# Patient Record
Sex: MALE | Race: WHITE | NOT HISPANIC OR LATINO | Employment: OTHER
[De-identification: names, ages, dates, MRNs, and addresses within clinical notes are randomized per-mention and may not be internally consistent; named-entity substitution may affect disease eponyms.]

---

## 2023-12-03 ENCOUNTER — HEALTH MAINTENANCE LETTER (OUTPATIENT)
Age: 43
End: 2023-12-03

## 2023-12-08 ENCOUNTER — OFFICE VISIT (OUTPATIENT)
Dept: FAMILY MEDICINE | Facility: CLINIC | Age: 43
End: 2023-12-08
Payer: COMMERCIAL

## 2023-12-08 ENCOUNTER — ANCILLARY PROCEDURE (OUTPATIENT)
Dept: GENERAL RADIOLOGY | Facility: CLINIC | Age: 43
End: 2023-12-08
Payer: COMMERCIAL

## 2023-12-08 VITALS
HEART RATE: 83 BPM | WEIGHT: 186.9 LBS | DIASTOLIC BLOOD PRESSURE: 78 MMHG | TEMPERATURE: 98.6 F | HEIGHT: 71 IN | SYSTOLIC BLOOD PRESSURE: 122 MMHG | OXYGEN SATURATION: 97 % | BODY MASS INDEX: 26.17 KG/M2

## 2023-12-08 DIAGNOSIS — R06.09 DYSPNEA ON EXERTION: ICD-10-CM

## 2023-12-08 DIAGNOSIS — L51.9 ERYTHEMA MULTIFORME: ICD-10-CM

## 2023-12-08 DIAGNOSIS — K70.10 ALCOHOLIC HEPATITIS WITHOUT ASCITES (H): ICD-10-CM

## 2023-12-08 DIAGNOSIS — Z13.228 SCREENING FOR ENDOCRINE, NUTRITIONAL, METABOLIC AND IMMUNITY DISORDER: ICD-10-CM

## 2023-12-08 DIAGNOSIS — Z13.21 SCREENING FOR ENDOCRINE, NUTRITIONAL, METABOLIC AND IMMUNITY DISORDER: ICD-10-CM

## 2023-12-08 DIAGNOSIS — D50.0 IRON DEFICIENCY ANEMIA DUE TO CHRONIC BLOOD LOSS: ICD-10-CM

## 2023-12-08 DIAGNOSIS — K92.1 BLOOD IN STOOL: ICD-10-CM

## 2023-12-08 DIAGNOSIS — Z13.29 SCREENING FOR ENDOCRINE, NUTRITIONAL, METABOLIC AND IMMUNITY DISORDER: ICD-10-CM

## 2023-12-08 DIAGNOSIS — F10.20 ALCOHOL USE DISORDER, MODERATE, DEPENDENCE (H): ICD-10-CM

## 2023-12-08 DIAGNOSIS — J02.9 SORE THROAT: ICD-10-CM

## 2023-12-08 DIAGNOSIS — Z13.0 SCREENING FOR ENDOCRINE, NUTRITIONAL, METABOLIC AND IMMUNITY DISORDER: ICD-10-CM

## 2023-12-08 DIAGNOSIS — K21.00 GASTROESOPHAGEAL REFLUX DISEASE WITH ESOPHAGITIS WITHOUT HEMORRHAGE: ICD-10-CM

## 2023-12-08 LAB
ALBUMIN SERPL BCG-MCNC: 4.8 G/DL (ref 3.5–5.2)
ALP SERPL-CCNC: 126 U/L (ref 40–150)
ALT SERPL W P-5'-P-CCNC: 287 U/L (ref 0–70)
ANION GAP SERPL CALCULATED.3IONS-SCNC: 11 MMOL/L (ref 7–15)
AST SERPL W P-5'-P-CCNC: 402 U/L (ref 0–45)
ATRIAL RATE - MUSE: 78 BPM
BASOPHILS # BLD AUTO: 0 10E3/UL (ref 0–0.2)
BASOPHILS NFR BLD AUTO: 1 %
BILIRUB SERPL-MCNC: 0.4 MG/DL
BUN SERPL-MCNC: 6.1 MG/DL (ref 6–20)
CALCIUM SERPL-MCNC: 9.6 MG/DL (ref 8.6–10)
CHLORIDE SERPL-SCNC: 103 MMOL/L (ref 98–107)
CHOLEST SERPL-MCNC: 189 MG/DL
CREAT SERPL-MCNC: 0.58 MG/DL (ref 0.67–1.17)
DEPRECATED HCO3 PLAS-SCNC: 23 MMOL/L (ref 22–29)
DIASTOLIC BLOOD PRESSURE - MUSE: NORMAL MMHG
EGFRCR SERPLBLD CKD-EPI 2021: >90 ML/MIN/1.73M2
EOSINOPHIL # BLD AUTO: 0.2 10E3/UL (ref 0–0.7)
EOSINOPHIL NFR BLD AUTO: 3 %
ERYTHROCYTE [DISTWIDTH] IN BLOOD BY AUTOMATED COUNT: 16.5 % (ref 10–15)
ERYTHROCYTE [SEDIMENTATION RATE] IN BLOOD BY WESTERGREN METHOD: 11 MM/HR (ref 0–15)
FASTING STATUS PATIENT QL REPORTED: NO
FERRITIN SERPL-MCNC: 128 NG/ML (ref 31–409)
FOLATE SERPL-MCNC: 5 NG/ML (ref 4.6–34.8)
GLUCOSE SERPL-MCNC: 115 MG/DL (ref 70–99)
HCT VFR BLD AUTO: 35.9 % (ref 40–53)
HDLC SERPL-MCNC: 38 MG/DL
HGB BLD-MCNC: 11.2 G/DL (ref 13.3–17.7)
IMM GRANULOCYTES # BLD: 0 10E3/UL
IMM GRANULOCYTES NFR BLD: 0 %
INTERPRETATION ECG - MUSE: NORMAL
IRON BINDING CAPACITY (ROCHE): 453 UG/DL (ref 240–430)
IRON SATN MFR SERPL: 4 % (ref 15–46)
IRON SERPL-MCNC: 18 UG/DL (ref 61–157)
LDLC SERPL CALC-MCNC: 125 MG/DL
LYMPHOCYTES # BLD AUTO: 1.7 10E3/UL (ref 0.8–5.3)
LYMPHOCYTES NFR BLD AUTO: 25 %
MCH RBC QN AUTO: 26 PG (ref 26.5–33)
MCHC RBC AUTO-ENTMCNC: 31.2 G/DL (ref 31.5–36.5)
MCV RBC AUTO: 83 FL (ref 78–100)
MONOCYTES # BLD AUTO: 0.7 10E3/UL (ref 0–1.3)
MONOCYTES NFR BLD AUTO: 10 %
NEUTROPHILS # BLD AUTO: 4.3 10E3/UL (ref 1.6–8.3)
NEUTROPHILS NFR BLD AUTO: 62 %
NONHDLC SERPL-MCNC: 151 MG/DL
P AXIS - MUSE: 30 DEGREES
PLATELET # BLD AUTO: 207 10E3/UL (ref 150–450)
POTASSIUM SERPL-SCNC: 4.5 MMOL/L (ref 3.4–5.3)
PR INTERVAL - MUSE: 156 MS
PROT SERPL-MCNC: 7.5 G/DL (ref 6.4–8.3)
QRS DURATION - MUSE: 100 MS
QT - MUSE: 374 MS
QTC - MUSE: 426 MS
R AXIS - MUSE: -13 DEGREES
RBC # BLD AUTO: 4.31 10E6/UL (ref 4.4–5.9)
SODIUM SERPL-SCNC: 137 MMOL/L (ref 135–145)
SYSTOLIC BLOOD PRESSURE - MUSE: NORMAL MMHG
T AXIS - MUSE: 11 DEGREES
TRIGL SERPL-MCNC: 131 MG/DL
TSH SERPL DL<=0.005 MIU/L-ACNC: 1.9 UIU/ML (ref 0.3–4.2)
VENTRICULAR RATE- MUSE: 78 BPM
VIT B12 SERPL-MCNC: 679 PG/ML (ref 232–1245)
WBC # BLD AUTO: 7 10E3/UL (ref 4–11)

## 2023-12-08 PROCEDURE — 80061 LIPID PANEL: CPT | Performed by: FAMILY MEDICINE

## 2023-12-08 PROCEDURE — 85025 COMPLETE CBC W/AUTO DIFF WBC: CPT | Performed by: FAMILY MEDICINE

## 2023-12-08 PROCEDURE — 93010 ELECTROCARDIOGRAM REPORT: CPT | Performed by: INTERNAL MEDICINE

## 2023-12-08 PROCEDURE — 84443 ASSAY THYROID STIM HORMONE: CPT | Performed by: FAMILY MEDICINE

## 2023-12-08 PROCEDURE — 36415 COLL VENOUS BLD VENIPUNCTURE: CPT | Performed by: FAMILY MEDICINE

## 2023-12-08 PROCEDURE — 99214 OFFICE O/P EST MOD 30 MIN: CPT | Mod: 25 | Performed by: FAMILY MEDICINE

## 2023-12-08 PROCEDURE — 82746 ASSAY OF FOLIC ACID SERUM: CPT | Performed by: FAMILY MEDICINE

## 2023-12-08 PROCEDURE — 83036 HEMOGLOBIN GLYCOSYLATED A1C: CPT | Performed by: FAMILY MEDICINE

## 2023-12-08 PROCEDURE — 93005 ELECTROCARDIOGRAM TRACING: CPT | Performed by: FAMILY MEDICINE

## 2023-12-08 PROCEDURE — 82728 ASSAY OF FERRITIN: CPT | Performed by: FAMILY MEDICINE

## 2023-12-08 PROCEDURE — 86665 EPSTEIN-BARR CAPSID VCA: CPT | Performed by: FAMILY MEDICINE

## 2023-12-08 PROCEDURE — 99386 PREV VISIT NEW AGE 40-64: CPT | Performed by: FAMILY MEDICINE

## 2023-12-08 PROCEDURE — 71046 X-RAY EXAM CHEST 2 VIEWS: CPT | Mod: TC | Performed by: RADIOLOGY

## 2023-12-08 PROCEDURE — 87389 HIV-1 AG W/HIV-1&-2 AB AG IA: CPT | Performed by: FAMILY MEDICINE

## 2023-12-08 PROCEDURE — 82607 VITAMIN B-12: CPT | Performed by: FAMILY MEDICINE

## 2023-12-08 PROCEDURE — 86481 TB AG RESPONSE T-CELL SUSP: CPT | Performed by: FAMILY MEDICINE

## 2023-12-08 PROCEDURE — 85652 RBC SED RATE AUTOMATED: CPT | Performed by: FAMILY MEDICINE

## 2023-12-08 PROCEDURE — 83550 IRON BINDING TEST: CPT | Performed by: FAMILY MEDICINE

## 2023-12-08 PROCEDURE — 86664 EPSTEIN-BARR NUCLEAR ANTIGEN: CPT | Performed by: FAMILY MEDICINE

## 2023-12-08 PROCEDURE — 83540 ASSAY OF IRON: CPT | Performed by: FAMILY MEDICINE

## 2023-12-08 PROCEDURE — 80053 COMPREHEN METABOLIC PANEL: CPT | Performed by: FAMILY MEDICINE

## 2023-12-08 RX ORDER — PANTOPRAZOLE SODIUM 40 MG/1
40 TABLET, DELAYED RELEASE ORAL DAILY
Qty: 90 TABLET | Refills: 0 | Status: SHIPPED | OUTPATIENT
Start: 2023-12-08 | End: 2024-02-28

## 2023-12-08 ASSESSMENT — ENCOUNTER SYMPTOMS
HEARTBURN: 1
DIZZINESS: 0
NAUSEA: 1
PARESTHESIAS: 0
JOINT SWELLING: 0
FEVER: 0
WEAKNESS: 0
ABDOMINAL PAIN: 0
MYALGIAS: 0
SHORTNESS OF BREATH: 1
CHILLS: 0
HEMATURIA: 0
HEADACHES: 0
COUGH: 1
NERVOUS/ANXIOUS: 1
DYSURIA: 0
DIARRHEA: 1
PALPITATIONS: 0
SORE THROAT: 1
CONSTIPATION: 0
EYE PAIN: 0
HEMATOCHEZIA: 1
FREQUENCY: 0
ARTHRALGIAS: 0

## 2023-12-08 ASSESSMENT — PAIN SCALES - GENERAL: PAINLEVEL: NO PAIN (0)
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
## 2023-12-09 LAB — HIV 1+2 AB+HIV1 P24 AG SERPL QL IA: NONREACTIVE

## 2023-12-10 PROBLEM — Z13.228 SCREENING FOR ENDOCRINE, NUTRITIONAL, METABOLIC AND IMMUNITY DISORDER: Status: ACTIVE | Noted: 2023-12-10

## 2023-12-10 PROBLEM — Z13.29 SCREENING FOR ENDOCRINE, NUTRITIONAL, METABOLIC AND IMMUNITY DISORDER: Status: ACTIVE | Noted: 2023-12-10

## 2023-12-10 PROBLEM — Z13.0 SCREENING FOR ENDOCRINE, NUTRITIONAL, METABOLIC AND IMMUNITY DISORDER: Status: ACTIVE | Noted: 2023-12-10

## 2023-12-10 PROBLEM — K92.1 BLOOD IN STOOL: Status: ACTIVE | Noted: 2023-12-10

## 2023-12-10 PROBLEM — F10.20 ALCOHOL USE DISORDER, MODERATE, DEPENDENCE (H): Status: ACTIVE | Noted: 2023-12-10

## 2023-12-10 PROBLEM — L51.9 ERYTHEMA MULTIFORME: Status: ACTIVE | Noted: 2023-12-10

## 2023-12-10 PROBLEM — R06.09 DYSPNEA ON EXERTION: Status: ACTIVE | Noted: 2023-12-10

## 2023-12-10 PROBLEM — Z13.21 SCREENING FOR ENDOCRINE, NUTRITIONAL, METABOLIC AND IMMUNITY DISORDER: Status: ACTIVE | Noted: 2023-12-10

## 2023-12-10 PROBLEM — K21.00 GASTROESOPHAGEAL REFLUX DISEASE WITH ESOPHAGITIS WITHOUT HEMORRHAGE: Status: ACTIVE | Noted: 2023-12-10

## 2023-12-10 NOTE — RESULT ENCOUNTER NOTE
Hello.  Many of the labs are good. However, your liver is significantly affected by the alcohol with ALT and AST being 8 and 10 times normal. This is consistent with the finding that your liver was enlarged. This is called alcoholic hepatitis. If one continues to drink in this situation, it can lead to cirrhosis.  You also have iron deficiency anemia. So you need to take iron tablets once or twice a day. The colonoscopy is important to find out why, since the reason for this is always bleeding from the gut in men.

## 2023-12-11 LAB
EBV NA IGG SER IA-ACNC: 5.2 U/ML
EBV NA IGG SER IA-ACNC: NORMAL [IU]/ML
EBV VCA IGG SER IA-ACNC: >750 U/ML
EBV VCA IGG SER IA-ACNC: POSITIVE
EBV VCA IGM SER IA-ACNC: <10 U/ML
EBV VCA IGM SER IA-ACNC: NORMAL
GAMMA INTERFERON BACKGROUND BLD IA-ACNC: 0.01 IU/ML
HBA1C MFR BLD: 5.2 % (ref 0–5.6)
M TB IFN-G BLD-IMP: NEGATIVE
M TB IFN-G CD4+ BCKGRND COR BLD-ACNC: 9.99 IU/ML
MITOGEN IGNF BCKGRD COR BLD-ACNC: 0 IU/ML
MITOGEN IGNF BCKGRD COR BLD-ACNC: 0.01 IU/ML
QUANTIFERON MITOGEN: 10 IU/ML
QUANTIFERON NIL TUBE: 0.01 IU/ML
QUANTIFERON TB1 TUBE: 0.01 IU/ML
QUANTIFERON TB2 TUBE: 0.02

## 2024-01-05 ENCOUNTER — TELEPHONE (OUTPATIENT)
Dept: GASTROENTEROLOGY | Facility: CLINIC | Age: 44
End: 2024-01-05
Payer: COMMERCIAL

## 2024-01-05 ENCOUNTER — MYC MEDICAL ADVICE (OUTPATIENT)
Dept: GASTROENTEROLOGY | Facility: CLINIC | Age: 44
End: 2024-01-05

## 2024-01-05 NOTE — TELEPHONE ENCOUNTER
"Endoscopy Scheduling Screen    Have you had a positive Covid test in the last 14 days?  No    Are you active on MyChart?   Yes    What insurance is in the chart?  Other:  [facility]    Ordering/Referring Provider: Shawna Ellison MD     (If ordering provider performs procedure, schedule with ordering provider unless otherwise instructed. )    BMI: Estimated body mass index is 26.07 kg/m  as calculated from the following:    Height as of 12/8/23: 1.803 m (5' 11\").    Weight as of 12/8/23: 84.8 kg (186 lb 14.4 oz).     Sedation Ordered  moderate sedation.   If patient BMI > 50 do not schedule in [facility].    If patient BMI > 45 do not schedule at [facility].    Are you taking methadone or Suboxone?  No    Are you taking any prescription medications for pain 3 or more times per week?   NO - No RN review required.    Do you have a history of malignant hyperthermia or adverse reaction to anesthesia?  No    (Females) Are you currently pregnant?   No     Have you been diagnosed or told you have pulmonary hypertension?   No    Do you have an LVAD?  No    Have you been told you have moderate to severe sleep apnea?  No    Have you been told you have COPD, asthma, or any other lung disease?  No    Do you have any heart conditions?  No     Have you ever had an organ transplant?   No    Have you ever had or are you awaiting a heart or lung transplant?   No    Have you had a stroke or transient ischemic attack (TIA aka \"mini stroke\" in the last 6 months?   No    Have you been diagnosed with or been told you have cirrhosis of the liver?   No    Are you currently on dialysis?   No    Do you need assistance transferring?   No    BMI: Estimated body mass index is 26.07 kg/m  as calculated from the following:    Height as of 12/8/23: 1.803 m (5' 11\").    Weight as of 12/8/23: 84.8 kg (186 lb 14.4 oz).     Is patients BMI > 40 and scheduling location [facility]?  No    Do you take an injectable medication for weight loss or diabetes (excluding " insulin)?  No    Do you take the medication Naltrexone?  No    Do you take blood thinners?  No       Prep   Are you currently on dialysis or do you have chronic kidney disease?  No    Do you have a diagnosis of diabetes?  No    Do you have a diagnosis of cystic fibrosis (CF)?  No    On a regular basis do you go 3 -5 days between bowel movements?  No    BMI > 40?  No    Preferred Pharmacy:      [id number] - [place], [place] - [address] AT [facility] [address]  [address]  [place] [address]  Phone: [phone] Fax: [phone]      Final Scheduling Details   Colonoscopy prep sent?  Standard MiraLAX    Procedure scheduled  Colonoscopy    Surgeon:  RACHEL     Date of procedure:  4/3     Pre-OP / PAC:   No - Not required for this site.    Location  [facility] - Patient preference.    Sedation   Moderate Sedation - Per order.      Patient Reminders:   You will receive a call from a Nurse to review instructions and health history.  This assessment must be completed prior to your procedure.  Failure to complete the Nurse assessment may result in the procedure being cancelled.      On the day of your procedure, please designate an adult(s) who can drive you home stay with you for the next 24 hours. The medicines used in the exam will make you sleepy. You will not be able to drive.      You cannot take public transportation, ride share services, or non-medical taxi service without a responsible caregiver.  Medical transport services are allowed with the requirement that a responsible caregiver will receive you at your destination.  We require that drivers and caregivers are confirmed prior to your procedure.

## 2024-02-02 ENCOUNTER — OFFICE VISIT (OUTPATIENT)
Dept: FAMILY MEDICINE | Facility: CLINIC | Age: 44
End: 2024-02-02
Payer: COMMERCIAL

## 2024-02-02 ENCOUNTER — ANCILLARY PROCEDURE (OUTPATIENT)
Dept: GENERAL RADIOLOGY | Facility: CLINIC | Age: 44
End: 2024-02-02
Payer: COMMERCIAL

## 2024-02-02 VITALS
HEART RATE: 94 BPM | TEMPERATURE: 98.9 F | OXYGEN SATURATION: 97 % | RESPIRATION RATE: 18 BRPM | HEIGHT: 70 IN | BODY MASS INDEX: 26.43 KG/M2 | WEIGHT: 184.6 LBS | SYSTOLIC BLOOD PRESSURE: 132 MMHG | DIASTOLIC BLOOD PRESSURE: 85 MMHG

## 2024-02-02 DIAGNOSIS — M25.522 LEFT ELBOW PAIN: ICD-10-CM

## 2024-02-02 DIAGNOSIS — Z11.59 NEED FOR HEPATITIS C SCREENING TEST: ICD-10-CM

## 2024-02-02 DIAGNOSIS — F10.20 ALCOHOL USE DISORDER, MODERATE, DEPENDENCE (H): Primary | ICD-10-CM
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
## 2024-02-02 LAB — HCV AB SERPL QL IA: NONREACTIVE

## 2024-02-02 PROCEDURE — 36415 COLL VENOUS BLD VENIPUNCTURE: CPT | Performed by: FAMILY MEDICINE

## 2024-02-02 PROCEDURE — 90746 HEPB VACCINE 3 DOSE ADULT IM: CPT | Performed by: FAMILY MEDICINE

## 2024-02-02 PROCEDURE — 99214 OFFICE O/P EST MOD 30 MIN: CPT | Mod: 25 | Performed by: FAMILY MEDICINE

## 2024-02-02 PROCEDURE — 90677 PCV20 VACCINE IM: CPT | Performed by: FAMILY MEDICINE

## 2024-02-02 PROCEDURE — 90686 IIV4 VACC NO PRSV 0.5 ML IM: CPT | Performed by: FAMILY MEDICINE

## 2024-02-02 PROCEDURE — 90471 IMMUNIZATION ADMIN: CPT | Performed by: FAMILY MEDICINE

## 2024-02-02 PROCEDURE — 73080 X-RAY EXAM OF ELBOW: CPT | Mod: TC | Performed by: RADIOLOGY

## 2024-02-02 PROCEDURE — 86803 HEPATITIS C AB TEST: CPT | Performed by: FAMILY MEDICINE

## 2024-02-02 PROCEDURE — 90472 IMMUNIZATION ADMIN EACH ADD: CPT | Performed by: FAMILY MEDICINE

## 2024-02-02 PROCEDURE — 90715 TDAP VACCINE 7 YRS/> IM: CPT | Performed by: FAMILY MEDICINE

## 2024-02-02 RX ORDER — NALTREXONE HYDROCHLORIDE 50 MG/1
50 TABLET, FILM COATED ORAL DAILY
Qty: 90 TABLET | Refills: 3 | Status: SHIPPED | OUTPATIENT
Start: 2024-02-02 | End: 2024-04-18

## 2024-02-02 NOTE — PROGRESS NOTES
"Assessment & Plan   Alcohol use disorder, moderate, dependence (H)  We again reviewed the medical options. He feels it will be hard to take a 3 times a day medication. His partner is supportive of his reduction/abstinence.  He is hoping to reduce and control his alcohol consumption. I explained that if his liver tests remain high, he will need to aim for sobriety, since otherwise he is at risk of death from alcoholism/liver failure.  - naltrexone (DEPADE/REVIA) 50 MG tablet  Dispense: 90 tablet; Refill: 3    Fall due to slipping on ice or snow, initial encounter  - XR Elbow Left G/E 3 Views    Left elbow pain  - XR Elbow Left G/E 3 Views    Need for hepatitis C screening test  - Hepatitis C Screen Reflex to HCV RNA Quant and Genotype    Elevated lifer function tests.  Repeat labs ordered.    BMI  Estimated body mass index is 26.13 kg/m  as calculated from the following:    Height as of this encounter: 1.79 m (5' 10.47\").    Weight as of this encounter: 83.7 kg (184 lb 9.6 oz).     Angel Sanchez is a 43 year old, presenting for the following health issues:  office visit  and Recheck Medication (Follow up)      2/2/2024     2:07 PM   Additional Questions   Roomed by tristian     He fell on ice and hit his left elbow and it still hurts. Hit the back of the head and left hip, they are better.  He is scheduled for a colonoscopy for April 3rd. His mother is willing to take him-- so he would rather [facility] instead.   Regarding reducing alcohol.     History of Present Illness       Reason for visit:  Follow up multiple    He eats 2-3 servings of fruits and vegetables daily.He consumes 2 sweetened beverage(s) daily.He exercises with enough effort to increase his heart rate 9 or less minutes per day.  He exercises with enough effort to increase his heart rate 3 or less days per week.   He is taking medications regularly.       Objective    /85 (BP Location: Left arm, Patient Position: Sitting, " "Cuff Size: Adult Regular)   Pulse 94   Temp 98.9  F (37.2  C) (Tympanic)   Resp 18   Ht 1.79 m (5' 10.47\")   Wt 83.7 kg (184 lb 9.6 oz)   SpO2 97%   BMI 26.13 kg/m    Body mass index is 26.13 kg/m .  Physical Exam   GENERAL: healthy, alert and no distress  EYES: grossly normal to inspection, and conjunctivae and sclerae normal  RESP: breathing unlabored, no cough or throat clearing.  MS: no gross musculoskeletal defects noted.  SKIN: no suspicious lesions or rashes visible  NEURO: Normal strength and tone, mentation intact and speech normal  PSYCH: mentation appears normal, affect normal/bright   Office Visit on 12/08/2023   Component Date Value Ref Range Status    Ventricular Rate 12/08/2023 78  BPM Final    Atrial Rate 12/08/2023 78  BPM Final    NV Interval 12/08/2023 156  ms Final    QRS Duration 12/08/2023 100  ms Final    QT 12/08/2023 374  ms Final    QTc 12/08/2023 426  ms Final    P Axis 12/08/2023 30  degrees Final    R AXIS 12/08/2023 -13  degrees Final    T Axis 12/08/2023 11  degrees Final    Interpretation ECG 12/08/2023    Final                    Value:Sinus rhythm with marked sinus arrhythmia  Leftward axis  Otherwise normal ECG  No previous ECGs available  Confirmed by JOSE ANGEL ALCANTARA MD LOC:[facility] ([id number]) on 12/8/2023 4:44:51 PM      Sodium 12/08/2023 137  135 - 145 mmol/L Final    Reference intervals for this test were updated on 09/26/2023 to more accurately reflect our healthy population. There may be differences in the flagging of prior results with similar values performed with this method. Interpretation of those prior results can be made in the context of the updated reference intervals.     Potassium 12/08/2023 4.5  3.4 - 5.3 mmol/L Final    Carbon Dioxide (CO2) 12/08/2023 23  22 - 29 mmol/L Final    Anion Gap 12/08/2023 11  7 - 15 mmol/L Final    Urea Nitrogen 12/08/2023 6.1  6.0 - 20.0 mg/dL Final    Creatinine 12/08/2023 0.58 (L)  0.67 - 1.17 mg/dL Final    GFR Estimate 12/08/2023 >90  " >60 mL/min/1.73m2 Final    Calcium 12/08/2023 9.6  8.6 - 10.0 mg/dL Final    Chloride 12/08/2023 103  98 - 107 mmol/L Final    Glucose 12/08/2023 115 (H)  70 - 99 mg/dL Final    Alkaline Phosphatase 12/08/2023 126  40 - 150 U/L Final    Reference intervals for this test were updated on 11/14/2023 to more accurately reflect our healthy population. There may be differences in the flagging of prior results with similar values performed with this method. Interpretation of those prior results can be made in the context of the updated reference intervals.    AST 12/08/2023 402 (H)  0 - 45 U/L Final    Reference intervals for this test were updated on 6/12/2023 to more accurately reflect our healthy population. There may be differences in the flagging of prior results with similar values performed with this method. Interpretation of those prior results can be made in the context of the updated reference intervals.    ALT 12/08/2023 287 (H)  0 - 70 U/L Final    Reference intervals for this test were updated on 6/12/2023 to more accurately reflect our healthy population. There may be differences in the flagging of prior results with similar values performed with this method. Interpretation of those prior results can be made in the context of the updated reference intervals.      Protein Total 12/08/2023 7.5  6.4 - 8.3 g/dL Final    Albumin 12/08/2023 4.8  3.5 - 5.2 g/dL Final    Bilirubin Total 12/08/2023 0.4  <=1.2 mg/dL Final    TSH 12/08/2023 1.90  0.30 - 4.20 uIU/mL Final    Cholesterol 12/08/2023 189  <200 mg/dL Final    Triglycerides 12/08/2023 131  <150 mg/dL Final    Direct Measure HDL 12/08/2023 38 (L)  >=40 mg/dL Final    LDL Cholesterol Calculated 12/08/2023 125 (H)  <=100 mg/dL Final    Non HDL Cholesterol 12/08/2023 151 (H)  <130 mg/dL Final    Patient Fasting > 8hrs? 12/08/2023 No   Final    HIV Antigen Antibody Combo 12/08/2023 Nonreactive  Nonreactive Final    HIV-1 p24 Ag & HIV-1/HIV-2 Ab Not Detected    EBV  Capsid Kiley IgG Instrument Value 12/08/2023 >750.0 (H)  <18.0 U/mL Final    EBV Capsid Antibody IgG 12/08/2023 Positive (A)  No detectable antibody. Final    Suggests recent or past exposure.    EBV Capsid Kiley IgM Instrument Value 12/08/2023 <10.0  <36.0 U/mL Final    EBV Capsid Antibody IgM 12/08/2023 No detectable antibody.  No detectable antibody. Final    EBV Nuclear Ag Kiley IgG Instrument * 12/08/2023 5.2  <18.0 U/mL Final    EBV Nuclear Antigen Antibody IgG 12/08/2023 No detectable antibody.  No detectable antibody. Final    Vitamin B12 12/08/2023 679  232 - 1,245 pg/mL Final    Folic Acid 12/08/2023 5.0  4.6 - 34.8 ng/mL Final    Erythrocyte Sedimentation Rate 12/08/2023 11  0 - 15 mm/hr Final    WBC Count 12/08/2023 7.0  4.0 - 11.0 10e3/uL Final    RBC Count 12/08/2023 4.31 (L)  4.40 - 5.90 10e6/uL Final    Hemoglobin 12/08/2023 11.2 (L)  13.3 - 17.7 g/dL Final    Hematocrit 12/08/2023 35.9 (L)  40.0 - 53.0 % Final    MCV 12/08/2023 83  78 - 100 fL Final    MCH 12/08/2023 26.0 (L)  26.5 - 33.0 pg Final    MCHC 12/08/2023 31.2 (L)  31.5 - 36.5 g/dL Final    RDW 12/08/2023 16.5 (H)  10.0 - 15.0 % Final    Platelet Count 12/08/2023 207  150 - 450 10e3/uL Final    % Neutrophils 12/08/2023 62  % Final    % Lymphocytes 12/08/2023 25  % Final    % Monocytes 12/08/2023 10  % Final    % Eosinophils 12/08/2023 3  % Final    % Basophils 12/08/2023 1  % Final    % Immature Granulocytes 12/08/2023 0  % Final    Absolute Neutrophils 12/08/2023 4.3  1.6 - 8.3 10e3/uL Final    Absolute Lymphocytes 12/08/2023 1.7  0.8 - 5.3 10e3/uL Final    Absolute Monocytes 12/08/2023 0.7  0.0 - 1.3 10e3/uL Final    Absolute Eosinophils 12/08/2023 0.2  0.0 - 0.7 10e3/uL Final    Absolute Basophils 12/08/2023 0.0  0.0 - 0.2 10e3/uL Final    Absolute Immature Granulocytes 12/08/2023 0.0  <=0.4 10e3/uL Final    Quantiferon Nil Tube 12/08/2023 0.01  IU/mL Final    Quantiferon TB1 Tube 12/08/2023 0.01  IU/mL Final    Quantiferon TB2 Tube  12/08/2023 0.02   Final    Quantiferon Mitogen 12/08/2023 10.00  IU/mL Final    Iron 12/08/2023 18 (L)  61 - 157 ug/dL Final    Iron Binding Capacity 12/08/2023 453 (H)  240 - 430 ug/dL Final    Iron Sat Index 12/08/2023 4 (L)  15 - 46 % Final    Ferritin 12/08/2023 128  31 - 409 ng/mL Final    Hemoglobin A1C 12/08/2023 5.2  0.0 - 5.6 % Final    Normal <5.7%   Prediabetes 5.7-6.4%    Diabetes 6.5% or higher     Note: Adopted from ADA consensus guidelines.    Quantiferon-TB Gold Plus 12/08/2023 Negative  Negative Final    No interferon gamma response to M.tuberculosis antigens was detected. Infection with M.tuberculosis is unlikely, however a single negative result does not exclude infection. In patients at high risk for infection, a second test should be considered in accordance with the 2017 ATS/IDSA/CDC Clinical Pract  ice Guidelines for Diagnosis of Tuberculosis in Adults and Children     TB1 Ag minus Nil Value 12/08/2023 0.00  IU/mL Final    TB2 Ag minus Nil Value 12/08/2023 0.01  IU/mL Final    Mitogen minus Nil Result 12/08/2023 9.99  IU/mL Final    Nil Result 12/08/2023 0.01  IU/mL Final     Results for orders placed or performed in visit on 02/02/24   XR Elbow Left G/E 3 Views     Status: None    Narrative    EXAM: XR ELBOW LEFT G/E 3 VIEWS  LOCATION: [facility]  DATE: 2/2/2024    INDICATION:  Fall due to slipping on ice or snow, initial encounter, Left elbow pain  COMPARISON: None.      Impression    IMPRESSION: Normal joint spaces and alignment. No fracture or joint effusion.   Results for orders placed or performed in visit on 02/02/24   Hepatitis C Screen Reflex to HCV RNA Quant and Genotype     Status: Normal   Result Value Ref Range    Hepatitis C Antibody Nonreactive Nonreactive   Signed Electronically by: MICHAEL WHYTE MD

## 2024-02-28 DIAGNOSIS — K21.00 GASTROESOPHAGEAL REFLUX DISEASE WITH ESOPHAGITIS WITHOUT HEMORRHAGE: ICD-10-CM

## 2024-02-28 RX ORDER — PANTOPRAZOLE SODIUM 40 MG/1
40 TABLET, DELAYED RELEASE ORAL DAILY
Qty: 90 TABLET | Refills: 2 | Status: SHIPPED | OUTPATIENT
Start: 2024-02-28 | End: 2024-08-02

## 2024-03-20 ENCOUNTER — OFFICE VISIT (OUTPATIENT)
Dept: FAMILY MEDICINE | Facility: CLINIC | Age: 44
End: 2024-03-20
Payer: COMMERCIAL

## 2024-03-20 VITALS
WEIGHT: 163.4 LBS | OXYGEN SATURATION: 100 % | RESPIRATION RATE: 18 BRPM | BODY MASS INDEX: 22.88 KG/M2 | TEMPERATURE: 97 F | HEIGHT: 71 IN | SYSTOLIC BLOOD PRESSURE: 112 MMHG | HEART RATE: 127 BPM | DIASTOLIC BLOOD PRESSURE: 76 MMHG

## 2024-03-20 DIAGNOSIS — E44.0 MODERATE PROTEIN-CALORIE MALNUTRITION (H): ICD-10-CM

## 2024-03-20 DIAGNOSIS — N17.9 ACUTE RENAL FAILURE, UNSPECIFIED ACUTE RENAL FAILURE TYPE (H): ICD-10-CM

## 2024-03-20 DIAGNOSIS — Z01.818 PREOP GENERAL PHYSICAL EXAM: ICD-10-CM

## 2024-03-20 DIAGNOSIS — K92.1 BLOOD IN STOOL: ICD-10-CM

## 2024-03-20 DIAGNOSIS — E87.1 HYPONATREMIA: ICD-10-CM

## 2024-03-20 DIAGNOSIS — K70.10 ALCOHOLIC HEPATITIS WITHOUT ASCITES (H): ICD-10-CM

## 2024-03-20 DIAGNOSIS — K21.00 GASTROESOPHAGEAL REFLUX DISEASE WITH ESOPHAGITIS WITHOUT HEMORRHAGE: Primary | ICD-10-CM

## 2024-03-20 LAB
BASOPHILS # BLD AUTO: 0.1 10E3/UL (ref 0–0.2)
BASOPHILS NFR BLD AUTO: 1 %
EOSINOPHIL # BLD AUTO: 0.1 10E3/UL (ref 0–0.7)
EOSINOPHIL NFR BLD AUTO: 1 %
ERYTHROCYTE [DISTWIDTH] IN BLOOD BY AUTOMATED COUNT: 17.9 % (ref 10–15)
HCT VFR BLD AUTO: 42.6 % (ref 40–53)
HGB BLD-MCNC: 14.1 G/DL (ref 13.3–17.7)
IMM GRANULOCYTES # BLD: 0.1 10E3/UL
IMM GRANULOCYTES NFR BLD: 1 %
LYMPHOCYTES # BLD AUTO: 2.7 10E3/UL (ref 0.8–5.3)
LYMPHOCYTES NFR BLD AUTO: 26 %
MCH RBC QN AUTO: 27.7 PG (ref 26.5–33)
MCHC RBC AUTO-ENTMCNC: 33.1 G/DL (ref 31.5–36.5)
MCV RBC AUTO: 84 FL (ref 78–100)
MONOCYTES # BLD AUTO: 3.2 10E3/UL (ref 0–1.3)
MONOCYTES NFR BLD AUTO: 30 %
NEUTROPHILS # BLD AUTO: 4.5 10E3/UL (ref 1.6–8.3)
NEUTROPHILS NFR BLD AUTO: 43 %
NRBC # BLD AUTO: 0 10E3/UL
NRBC BLD AUTO-RTO: 0 /100
PLATELET # BLD AUTO: 419 10E3/UL (ref 150–450)
RBC # BLD AUTO: 5.09 10E6/UL (ref 4.4–5.9)
WBC # BLD AUTO: 10.6 10E3/UL (ref 4–11)

## 2024-03-20 PROCEDURE — 99214 OFFICE O/P EST MOD 30 MIN: CPT | Performed by: FAMILY MEDICINE

## 2024-03-20 PROCEDURE — 85007 BL SMEAR W/DIFF WBC COUNT: CPT | Performed by: FAMILY MEDICINE

## 2024-03-20 PROCEDURE — 85025 COMPLETE CBC W/AUTO DIFF WBC: CPT | Performed by: FAMILY MEDICINE

## 2024-03-20 PROCEDURE — 80053 COMPREHEN METABOLIC PANEL: CPT | Performed by: FAMILY MEDICINE

## 2024-03-20 PROCEDURE — 36415 COLL VENOUS BLD VENIPUNCTURE: CPT | Performed by: FAMILY MEDICINE

## 2024-03-20 RX ORDER — ESCITALOPRAM OXALATE 20 MG/1
20 TABLET ORAL DAILY
COMMUNITY
Start: 2024-03-14 | End: 2024-03-20

## 2024-03-20 RX ORDER — BENZONATATE 200 MG/1
200 CAPSULE ORAL 3 TIMES DAILY
COMMUNITY
Start: 2024-03-14 | End: 2024-04-18

## 2024-03-20 RX ORDER — TRAMADOL HYDROCHLORIDE 50 MG/1
50 TABLET ORAL 3 TIMES DAILY
COMMUNITY
Start: 2024-03-14 | End: 2024-04-18

## 2024-03-20 RX ORDER — ESCITALOPRAM OXALATE 20 MG/1
20 TABLET ORAL DAILY
Qty: 90 TABLET | Refills: 0 | Status: SHIPPED | OUTPATIENT
Start: 2024-03-20 | End: 2024-04-18

## 2024-03-20 RX ORDER — ONDANSETRON 8 MG/1
8 TABLET, ORALLY DISINTEGRATING ORAL
COMMUNITY
Start: 2024-03-14 | End: 2024-08-02

## 2024-03-20 RX ORDER — LANOLIN ALCOHOL/MO/W.PET/CERES
1000 CREAM (GRAM) TOPICAL DAILY
COMMUNITY
End: 2024-04-18

## 2024-03-20 RX ORDER — FERROUS SULFATE 325(65) MG
325 TABLET ORAL DAILY
COMMUNITY
End: 2024-04-18

## 2024-03-20 NOTE — PROGRESS NOTES
Assessment & Plan   Gastroesophageal reflux disease with esophagitis without hemorrhage  Abnormal CT of the esophagus  - Adult GI  Referral - Procedure Only    Alcoholic hepatitis without ascites (H28)  Discussed medications to help reduce drinking. He needs full sobriety.  Discussed social groups, treatment inpatient vs PHP or IOP.  - escitalopram (LEXAPRO) 20 MG tablet  Dispense: 90 tablet; Refill: 0  - Comprehensive metabolic panel (BMP + Alb, Alk Phos, ALT, AST, Total. Bili, TP)  - AST    Acute renal failure, unspecified acute renal failure type (H24)  - Comprehensive metabolic panel (BMP + Alb, Alk Phos, ALT, AST, Total. Bili, TP)    Preop general physical exam    Blood in stool  - CBC with platelets and differential    Hyponatremia  - Sodium    MED REC REQUIRED  Post Medication Reconciliation Status:       Angel Sanchez is a 43 year old, presenting for the following health issues:  Hospital F/U, Recheck Medication, and Pre-Op Exam (Pt is here for hospital /ed follow up and Preop )      3/20/2024     1:32 PM   Additional Questions   Roomed by tristian         3/20/2024     1:32 PM   Patient Reported Additional Medications   Patient reports taking the following new medications yes , need to reconcile     HPI   He started vomiting two days before going in. Kept drinking and kept throwing it up. Then went to the ED and was kept for two days. He had to return a couple days later, feeling the same and got some more fluids.  Solid food still hurts going down. He also it spitting up clear, bubbly. Able t drink without feeling sick.  He had been drinking the days before. Not binge amount.   He has not had cigarette or alcohol in 10 days.  He was not using the naltrexone consistently and wasn't supposed to take it while on Levaquin.  He improved his diet and lost down to 175#. Then lost 10# with the illness. 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    Hospital Follow-up  "Visit:    Hospital/Nursing Home/IP Rehab Facility:  [facility]   Date of Admission: 3/12  Date of Discharge: 3/14  Reason(s) for Admission: vomiting      Was your hospitalization related to COVID-19? No   Problems taking medications regularly:  None  Medication changes since discharge: None  Problems adhering to non-medication therapy:  None    Summary of hospitalization:  CareEverywhere information obtained and reviewed  Diagnostic Tests/Treatments reviewed.  Follow up needed: none  Other Healthcare Providers Involved in Patient s Care:         None  Update since discharge: stable.     Plan of care communicated with patient           ED/UC Followup:    Facility:  [facility]   Date of visit: 3/17  Reason for visit: vomiting   Current Status: stable        Objective    /76 (BP Location: Left arm, Patient Position: Sitting, Cuff Size: Adult Regular)   Pulse (!) 127   Temp 97  F (36.1  C) (Tympanic)   Resp 18   Ht 1.803 m (5' 11\")   Wt 74.1 kg (163 lb 6.4 oz)   SpO2 100%   BMI 22.79 kg/m    Body mass index is 22.79 kg/m .  Physical Exam   GENERAL: alert and no distress  EYES: Eyes grossly normal to inspection, PERRL and conjunctivae and sclerae normal  HENT: ear canals and TM's normal, nose and mouth without ulcers or lesions  NECK: no adenopathy, no asymmetry, masses, or scars  RESP: lungs clear to auscultation - no rales, rhonchi or wheezes  CV: regular rate and rhythm, normal S1 S2, no S3 or S4, no murmur, click or rub, no peripheral edema  ABDOMEN: soft, nontender, no hepatosplenomegaly, no masses and bowel sounds normal  MS: no gross musculoskeletal defects noted, no edema  SKIN: no suspicious lesions or rashes  NEURO: Normal strength and tone, mentation intact and speech normal  PSYCH: mentation appears normal, affect normal/bright    Results for orders placed or performed in visit on 03/20/24   Comprehensive metabolic panel (BMP + Alb, Alk Phos, ALT, AST, Total. Bili, TP)     Status: Abnormal "   Result Value Ref Range    Sodium 131 (L) 135 - 145 mmol/L    Potassium 3.6 3.4 - 5.3 mmol/L    Carbon Dioxide (CO2) 26 22 - 29 mmol/L    Anion Gap 11 7 - 15 mmol/L    Urea Nitrogen 8.5 6.0 - 20.0 mg/dL    Creatinine 0.78 0.67 - 1.17 mg/dL    GFR Estimate >90 >60 mL/min/1.73m2    Calcium 9.6 8.6 - 10.0 mg/dL    Chloride 94 (L) 98 - 107 mmol/L    Glucose 105 (H) 70 - 99 mg/dL    Alkaline Phosphatase 126 40 - 150 U/L     (H) 0 - 45 U/L     (H) 0 - 70 U/L    Protein Total 7.2 6.4 - 8.3 g/dL    Albumin 4.2 3.5 - 5.2 g/dL    Bilirubin Total 0.5 <=1.2 mg/dL   CBC with platelets and differential     Status: Abnormal   Result Value Ref Range    WBC Count 10.6 4.0 - 11.0 10e3/uL    RBC Count 5.09 4.40 - 5.90 10e6/uL    Hemoglobin 14.1 13.3 - 17.7 g/dL    Hematocrit 42.6 40.0 - 53.0 %    MCV 84 78 - 100 fL    MCH 27.7 26.5 - 33.0 pg    MCHC 33.1 31.5 - 36.5 g/dL    RDW 17.9 (H) 10.0 - 15.0 %    Platelet Count 419 150 - 450 10e3/uL    % Neutrophils 43 %    % Lymphocytes 26 %    % Monocytes 30 %    % Eosinophils 1 %    % Basophils 1 %    % Immature Granulocytes 1 %    NRBCs per 100 WBC 0 <1 /100    Absolute Neutrophils 4.5 1.6 - 8.3 10e3/uL    Absolute Lymphocytes 2.7 0.8 - 5.3 10e3/uL    Absolute Monocytes 3.2 (H) 0.0 - 1.3 10e3/uL    Absolute Eosinophils 0.1 0.0 - 0.7 10e3/uL    Absolute Basophils 0.1 0.0 - 0.2 10e3/uL    Absolute Immature Granulocytes 0.1 <=0.4 10e3/uL    Absolute NRBCs 0.0 10e3/uL   Manual Differential     Status: Abnormal   Result Value Ref Range    % Neutrophils 36 %    % Lymphocytes 32 %    % Monocytes 30 %    % Eosinophils 2 %    % Basophils 0 %    Absolute Neutrophils 3.8 1.6 - 8.3 10e3/uL    Absolute Lymphocytes 3.4 0.8 - 5.3 10e3/uL    Absolute Monocytes 3.2 (H) 0.0 - 1.3 10e3/uL    Absolute Eosinophils 0.2 0.0 - 0.7 10e3/uL    Absolute Basophils 0.0 0.0 - 0.2 10e3/uL    RBC Morphology Confirmed RBC Indices     Platelet Assessment  Automated Count Confirmed. Platelet morphology is  normal.     Automated Count Confirmed. Platelet morphology is normal.    Reactive Lymphocytes Present (A) None Seen    Pathologist Review Comments (Blood) Slide reviewed by Dr. López. 03/21/24.    CBC with platelets and differential     Status: Abnormal    Narrative    The following orders were created for panel order CBC with platelets and differential.  Procedure                               Abnormality         Status                     ---------                               -----------         ------                     CBC with platelets and d...[[id number]]  Abnormal            Final result               Manual Differential[[id number]]          Abnormal            Final result                 Please view results for these tests on the individual orders.   Signed Electronically by: MICHAEL WHYTE MD

## 2024-03-20 NOTE — PATIENT INSTRUCTIONS
Upper endoscopy: [facility] will call you to coordinate your care as prescribed by the provider. If you don t hear from a representative within 2 business days, please call [phone].   Preparing for Your Surgery  Getting started  A nurse will call you to review your health history and instructions. They will give you an arrival time based on your scheduled surgery time. Please be ready to share:  Your doctor's clinic name and phone number  Your medical, surgical, and anesthesia history  A list of allergies and sensitivities  A list of medicines, including herbal treatments and over-the-counter drugs  Whether the patient has a legal guardian (ask how to send us the papers in advance)  Please tell us if you're pregnant--or if there's any chance you might be pregnant. Some surgeries may injure a fetus (unborn baby), so they require a pregnancy test. Surgeries that are safe for a fetus don't always need a test, and you can choose whether to have one.   If you have a child who's having surgery, please ask for a copy of Preparing for Your Child's Surgery.    Preparing for surgery  Within 10 to 30 days of surgery: Have a pre-op exam (sometimes called an H&P, or History and Physical). This can be done at a clinic or pre-operative center.  If you're having a , you may not need this exam. Talk to your care team.  At your pre-op exam, talk to your care team about all medicines you take. If you need to stop any medicines before surgery, ask when to start taking them again.  We do this for your safety. Many medicines can make you bleed too much during surgery. Some change how well surgery (anesthesia) drugs work.  Call your insurance company to let them know you're having surgery. (If you don't have insurance, call [phone].)  Call your clinic if there's any change in your health. This includes signs of a cold or flu (sore throat, runny nose, cough, rash, fever). It also includes a scrape or scratch  near the surgery site.  If you have questions on the day of surgery, call your hospital or surgery center.  Eating and drinking guidelines  For your safety: Unless your surgeon tells you otherwise, follow the guidelines below.  Eat and drink as usual until 8 hours before you arrive for surgery. After that, no food or milk.  Drink clear liquids until 2 hours before you arrive. These are liquids you can see through, like water, Gatorade, and Propel Water. They also include plain black coffee and tea (no cream or milk), candy, and breath mints. You can spit out gum when you arrive.  If you drink alcohol: Stop drinking it the night before surgery.  If your care team tells you to take medicine on the morning of surgery, it's okay to take it with a sip of water.  Preventing infection  Shower or bathe the night before and morning of your surgery. Follow the instructions your clinic gave you. (If no instructions, use regular soap.)  Don't shave or clip hair near your surgery site. We'll remove the hair if needed.  Don't smoke or vape the morning of surgery. You may chew nicotine gum up to 2 hours before surgery. A nicotine patch is okay.  Note: Some surgeries require you to completely quit smoking and nicotine. Check with your surgeon.  Your care team will make every effort to keep you safe from infection. We will:  Clean our hands often with soap and water (or an alcohol-based hand rub).  Clean the skin at your surgery site with a special soap that kills germs.  Give you a special gown to keep you warm. (Cold raises the risk of infection.)  Wear special hair covers, masks, gowns and gloves during surgery.  Give antibiotic medicine, if prescribed. Not all surgeries need antibiotics.  What to bring on the day of surgery  Photo ID and insurance card  Copy of your health care directive, if you have one  Glasses and hearing aids (bring cases)  You can't wear contacts during surgery  Inhaler and eye drops, if you use them (tell  us about these when you arrive)  CPAP machine or breathing device, if you use them  A few personal items, if spending the night  If you have . . .  A pacemaker, ICD (cardiac defibrillator) or other implant: Bring the ID card.  An implanted stimulator: Bring the remote control.  A legal guardian: Bring a copy of the certified (court-stamped) guardianship papers.  Please remove any jewelry, including body piercings. Leave jewelry and other valuables at home.  If you're going home the day of surgery  You must have a responsible adult drive you home. They should stay with you overnight as well.  If you don't have someone to stay with you, and you aren't safe to go home alone, we may keep you overnight. Insurance often won't pay for this.  After surgery  If it's hard to control your pain or you need more pain medicine, please call your surgeon's office.  Questions?   If you have any questions for your care team, list them here: _________________________________________________________________________________________________________________________________________________________________________ ____________________________________ ____________________________________ ____________________________________  For informational purposes only. Not to replace the advice of your health care provider. Copyright   2003, 2019 [facility]. All rights reserved. Clinically reviewed by Gianna Reid MD. [employer] 104626 - REV 12/22.    How to Take Your Medication Before Surgery  - HOLD (do not take) naltrexone for three days before the procedure  - STOP taking all vitamins and herbal supplements 14 days before surgery.  ______________________________________________________________________________________________________________________ ____________________________________ ____________________________________ ____________________________________  For informational purposes only. Not to replace the advice of your health care  provider. Copyright   2003, 2019 [facility]. All rights reserved. Clinically reviewed by Gianna Reid MD. [employer] 312331 - REV 12/22.    How to Take Your Medication Before Surgery  - STOP taking all vitamins and herbal supplements 14 days before surgery.  - Stop naltrexone three days before the procedure.

## 2024-03-20 NOTE — PROGRESS NOTES
"  {PROVIDER CHARTING PREFERENCE:391116}    Angel Sanchez is a 43 year old, presenting for the following health issues:  Hospital F/U, Recheck Medication, and Pre-Op Exam (Pt is here for hospital /ed follow up and Preop )        3/20/2024     1:32 PM   Additional Questions   Roomed by tristian         3/20/2024     1:32 PM   Patient Reported Additional Medications   Patient reports taking the following new medications yes , need to reconcile     HPI     {MA/LPN/RN Pre-Provider Visit Orders- hCG/UA/Strep (Optional):049557}    Hospital Follow-up Visit:    Hospital/Nursing Home/IP Rehab Facility:  [facility]   Date of Admission: 3/12  Date of Discharge: 3/14  Reason(s) for Admission: vomiting      Was your hospitalization related to COVID-19? No   Problems taking medications regularly:  None  Medication changes since discharge: None  Problems adhering to non-medication therapy:  None    Summary of hospitalization:  CareEverywhere information obtained and reviewed  Diagnostic Tests/Treatments reviewed.  Follow up needed: none  Other Healthcare Providers Involved in Patient s Care:         None  Update since discharge: stable. {TIP  Include information from family/caregivers, SNF, Care Coordination :675436}        Plan of care communicated with patient     {Reference  Coding guidelines- Moderate Complexity F2F/Video within 7 - 14 days of discharge 1526911, High Complexity F2F/Video within 7 days 5639735 or [id number] days 1299224 :518292}      ED/UC Followup:    Facility:  [facility]   Date of visit: 3/17  Reason for visit: vomiting   Current Status: stable    {additonal problems for provider to add (Optional):015835}    {ROS Picklists (Optional):625898}      Objective    /76 (BP Location: Left arm, Patient Position: Sitting, Cuff Size: Adult Regular)   Pulse (!) 127   Temp 97  F (36.1  C) (Tympanic)   Resp 18   Ht 1.803 m (5' 11\")   Wt 74.1 kg (163 lb 6.4 oz)   SpO2 100%   BMI 22.79 kg/m    Body mass " index is 22.79 kg/m .  Physical Exam   {Exam List (Optional):926743}    {Diagnostic Test Results (Optional):860115}        Signed Electronically by: MICHAEL WHYTE MD  {Email feedback regarding this note to [email or web address]   :330123}

## 2024-03-21 LAB
ALBUMIN SERPL BCG-MCNC: 4.2 G/DL (ref 3.5–5.2)
ALP SERPL-CCNC: 126 U/L (ref 40–150)
ALT SERPL W P-5'-P-CCNC: 180 U/L (ref 0–70)
ANION GAP SERPL CALCULATED.3IONS-SCNC: 11 MMOL/L (ref 7–15)
AST SERPL W P-5'-P-CCNC: 174 U/L (ref 0–45)
BASOPHILS # BLD MANUAL: 0 10E3/UL (ref 0–0.2)
BASOPHILS NFR BLD MANUAL: 0 %
BILIRUB SERPL-MCNC: 0.5 MG/DL
BUN SERPL-MCNC: 8.5 MG/DL (ref 6–20)
CALCIUM SERPL-MCNC: 9.6 MG/DL (ref 8.6–10)
CHLORIDE SERPL-SCNC: 94 MMOL/L (ref 98–107)
CREAT SERPL-MCNC: 0.78 MG/DL (ref 0.67–1.17)
DEPRECATED HCO3 PLAS-SCNC: 26 MMOL/L (ref 22–29)
EGFRCR SERPLBLD CKD-EPI 2021: >90 ML/MIN/1.73M2
EOSINOPHIL # BLD MANUAL: 0.2 10E3/UL (ref 0–0.7)
EOSINOPHIL NFR BLD MANUAL: 2 %
GLUCOSE SERPL-MCNC: 105 MG/DL (ref 70–99)
LYMPHOCYTES # BLD MANUAL: 3.4 10E3/UL (ref 0.8–5.3)
LYMPHOCYTES NFR BLD MANUAL: 32 %
MONOCYTES # BLD MANUAL: 3.2 10E3/UL (ref 0–1.3)
MONOCYTES NFR BLD MANUAL: 30 %
NEUTROPHILS # BLD MANUAL: 3.8 10E3/UL (ref 1.6–8.3)
NEUTROPHILS NFR BLD MANUAL: 36 %
PATH REV: ABNORMAL
PLAT MORPH BLD: ABNORMAL
POTASSIUM SERPL-SCNC: 3.6 MMOL/L (ref 3.4–5.3)
PROT SERPL-MCNC: 7.2 G/DL (ref 6.4–8.3)
RBC MORPH BLD: ABNORMAL
SODIUM SERPL-SCNC: 131 MMOL/L (ref 135–145)
VARIANT LYMPHS BLD QL SMEAR: PRESENT

## 2024-03-21 NOTE — TELEPHONE ENCOUNTER
Pranav Nava MD Begley, Diane, MD; P Endoscopy Scheduling Pool  Yes  - definitely.    I see that you already placed an order.    EGD added to 4/3 procedure as requested by Dr. Nava.

## 2024-03-31 PROBLEM — E46 PROTEIN-CALORIE MALNUTRITION (H): Status: ACTIVE | Noted: 2024-03-31

## 2024-04-03 ENCOUNTER — HOSPITAL ENCOUNTER (OUTPATIENT)
Facility: CLINIC | Age: 44
Discharge: HOME | End: 2024-04-03
Attending: INTERNAL MEDICINE | Admitting: INTERNAL MEDICINE
Payer: COMMERCIAL

## 2024-04-03 VITALS
DIASTOLIC BLOOD PRESSURE: 101 MMHG | OXYGEN SATURATION: 98 % | RESPIRATION RATE: 20 BRPM | HEART RATE: 89 BPM | SYSTOLIC BLOOD PRESSURE: 128 MMHG

## 2024-04-03 DIAGNOSIS — K21.00 GASTROESOPHAGEAL REFLUX DISEASE WITH ESOPHAGITIS WITHOUT HEMORRHAGE: Primary | ICD-10-CM

## 2024-04-03 LAB
COLONOSCOPY: NORMAL
UPPER GI ENDOSCOPY: NORMAL

## 2024-04-03 PROCEDURE — 250N000009 HC RX 250: Performed by: INTERNAL MEDICINE

## 2024-04-03 PROCEDURE — 99153 MOD SED SAME PHYS/QHP EA: CPT | Performed by: INTERNAL MEDICINE

## 2024-04-03 PROCEDURE — 88312 SPECIAL STAINS GROUP 1: CPT | Mod: 26 | Performed by: PATHOLOGY

## 2024-04-03 PROCEDURE — 88342 IMHCHEM/IMCYTCHM 1ST ANTB: CPT | Mod: TC | Performed by: INTERNAL MEDICINE

## 2024-04-03 PROCEDURE — 88342 IMHCHEM/IMCYTCHM 1ST ANTB: CPT | Mod: 26 | Performed by: PATHOLOGY

## 2024-04-03 PROCEDURE — 43249 ESOPH EGD DILATION <30 MM: CPT | Performed by: INTERNAL MEDICINE

## 2024-04-03 PROCEDURE — 250N000011 HC RX IP 250 OP 636: Performed by: INTERNAL MEDICINE

## 2024-04-03 PROCEDURE — 88305 TISSUE EXAM BY PATHOLOGIST: CPT | Mod: 26 | Performed by: PATHOLOGY

## 2024-04-03 PROCEDURE — 43239 EGD BIOPSY SINGLE/MULTIPLE: CPT | Performed by: INTERNAL MEDICINE

## 2024-04-03 PROCEDURE — 88341 IMHCHEM/IMCYTCHM EA ADD ANTB: CPT | Mod: 26 | Performed by: PATHOLOGY

## 2024-04-03 PROCEDURE — 45378 DIAGNOSTIC COLONOSCOPY: CPT | Performed by: INTERNAL MEDICINE

## 2024-04-03 PROCEDURE — G0500 MOD SEDAT ENDO SERVICE >5YRS: HCPCS | Performed by: INTERNAL MEDICINE

## 2024-04-03 PROCEDURE — 45380 COLONOSCOPY AND BIOPSY: CPT | Performed by: INTERNAL MEDICINE

## 2024-04-03 RX ORDER — NALOXONE HYDROCHLORIDE 0.4 MG/ML
0.4 INJECTION, SOLUTION INTRAMUSCULAR; INTRAVENOUS; SUBCUTANEOUS
Status: DISCONTINUED | OUTPATIENT
Start: 2024-04-03 | End: 2024-04-03 | Stop reason: HOSPADM

## 2024-04-03 RX ORDER — FENTANYL CITRATE 50 UG/ML
INJECTION, SOLUTION INTRAMUSCULAR; INTRAVENOUS PRN
Status: DISCONTINUED | OUTPATIENT
Start: 2024-04-03 | End: 2024-04-03 | Stop reason: HOSPADM

## 2024-04-03 RX ORDER — PROCHLORPERAZINE MALEATE 5 MG
10 TABLET ORAL EVERY 6 HOURS PRN
Status: DISCONTINUED | OUTPATIENT
Start: 2024-04-03 | End: 2024-04-03 | Stop reason: HOSPADM

## 2024-04-03 RX ORDER — FLUMAZENIL 0.1 MG/ML
0.2 INJECTION, SOLUTION INTRAVENOUS
Status: DISCONTINUED | OUTPATIENT
Start: 2024-04-03 | End: 2024-04-03 | Stop reason: HOSPADM

## 2024-04-03 RX ORDER — NALOXONE HYDROCHLORIDE 0.4 MG/ML
0.2 INJECTION, SOLUTION INTRAMUSCULAR; INTRAVENOUS; SUBCUTANEOUS
Status: DISCONTINUED | OUTPATIENT
Start: 2024-04-03 | End: 2024-04-03 | Stop reason: HOSPADM

## 2024-04-03 RX ORDER — LIDOCAINE 40 MG/G
CREAM TOPICAL
Status: DISCONTINUED | OUTPATIENT
Start: 2024-04-03 | End: 2024-04-03 | Stop reason: HOSPADM

## 2024-04-03 RX ORDER — ONDANSETRON 2 MG/ML
4 INJECTION INTRAMUSCULAR; INTRAVENOUS EVERY 6 HOURS PRN
Status: DISCONTINUED | OUTPATIENT
Start: 2024-04-03 | End: 2024-04-03 | Stop reason: HOSPADM

## 2024-04-03 RX ORDER — ONDANSETRON 2 MG/ML
4 INJECTION INTRAMUSCULAR; INTRAVENOUS
Status: DISCONTINUED | OUTPATIENT
Start: 2024-04-03 | End: 2024-04-03 | Stop reason: HOSPADM

## 2024-04-03 RX ORDER — ONDANSETRON 4 MG/1
4 TABLET, ORALLY DISINTEGRATING ORAL EVERY 6 HOURS PRN
Status: DISCONTINUED | OUTPATIENT
Start: 2024-04-03 | End: 2024-04-03 | Stop reason: HOSPADM

## 2024-04-03 ASSESSMENT — ACTIVITIES OF DAILY LIVING (ADL)
ADLS_ACUITY_SCORE: 35

## 2024-04-03 NOTE — OR NURSING
Pt tolerated EGD with dilatation and biopsies, as well as a colonoscopy, with random biopsies, very well, under conscious sedation. Pt was returned to recovery on RA

## 2024-04-03 NOTE — H&P
Gastroenterology Pre-op History and Physical    Daniel Sharma MRN# [id number]   Age: 43 year old YOB: 1980      Date of Surgery: 04/03/24  [facility]      Date of Exam 4/3/2024 Facility Same Day       Primary care provider: Shawna Ellison         Chief Complaint and/or Reason for Procedure:   44 yo male with hx of alcohol use disorder, chronic opioids. Recent episode of frequent emesis. CT suggested esophageal wall thickening. Blood in stools (thought hemorrhoids) and diarrhea.         Past Medical and Surgical History:     No past medical history on file.  History reviewed. No pertinent surgical history.         Medications (include herbals and vitamins):        Medications Prior to Admission   Medication Sig Dispense Refill Last Dose    escitalopram (LEXAPRO) 20 MG tablet Take 1 tablet (20 mg) by mouth daily 90 tablet 0 4/2/2024    pantoprazole (PROTONIX) 40 MG EC tablet TAKE 1 TABLET(40 MG) BY MOUTH DAILY 90 tablet 2 4/2/2024    benzonatate (TESSALON) 200 MG capsule Take 200 mg by mouth 3 times daily       cyanocobalamin (VITAMIN B-12) 1000 MCG tablet Take 1,000 mcg by mouth daily       ferrous sulfate (FEROSUL) 325 (65 Fe) MG tablet Take 325 mg by mouth daily   3/13/2024    naltrexone (DEPADE/REVIA) 50 MG tablet Take 1 tablet (50 mg) by mouth daily 90 tablet 3     ondansetron (ZOFRAN ODT) 8 MG ODT tab Place 8 mg under the tongue       traMADol (ULTRAM) 50 MG tablet Take 50 mg by mouth 3 times daily For pain                Allergies:    No Known Allergies            Physical Exam:   All vitals have been reviewed  No data found.  No intake/output data recorded.  Airway assessment:   Patient is able to open mouth wide  Patient is able to stick out tongue  Mallampatti classification: Class III (visualization of the soft palate and base of uvula)}      Lungs:   No increased work of breathing, good air exchange, clear to auscultation bilaterally, no crackles or  wheezing     Cardiovascular:   regular rate and rhythm and normal S1 and S2                 Anesthetic risk and/or ASA classification:     ASA 2    Pranav Nava MD

## 2024-04-04 LAB
PATH REPORT.ADDENDUM SPEC: NORMAL
PATH REPORT.COMMENTS IMP SPEC: NORMAL
PATH REPORT.FINAL DX SPEC: NORMAL
PATH REPORT.GROSS SPEC: NORMAL
PATH REPORT.MICROSCOPIC SPEC OTHER STN: NORMAL
PATH REPORT.RELEVANT HX SPEC: NORMAL
PHOTO IMAGE: NORMAL

## 2024-04-18 ENCOUNTER — OFFICE VISIT (OUTPATIENT)
Dept: FAMILY MEDICINE | Facility: CLINIC | Age: 44
End: 2024-04-18
Payer: COMMERCIAL

## 2024-04-18 VITALS
HEART RATE: 86 BPM | RESPIRATION RATE: 20 BRPM | SYSTOLIC BLOOD PRESSURE: 132 MMHG | BODY MASS INDEX: 22.79 KG/M2 | TEMPERATURE: 97.6 F | OXYGEN SATURATION: 98 % | DIASTOLIC BLOOD PRESSURE: 58 MMHG | WEIGHT: 162.8 LBS | HEIGHT: 71 IN

## 2024-04-18 DIAGNOSIS — L51.9 ERYTHEMA MULTIFORME: ICD-10-CM

## 2024-04-18 DIAGNOSIS — K21.00 GASTROESOPHAGEAL REFLUX DISEASE WITH ESOPHAGITIS WITHOUT HEMORRHAGE: Primary | ICD-10-CM

## 2024-04-18 DIAGNOSIS — K70.10 ALCOHOLIC HEPATITIS WITHOUT ASCITES (H): ICD-10-CM

## 2024-04-18 DIAGNOSIS — F17.201 NICOTINE DEPENDENCE IN REMISSION, UNSPECIFIED NICOTINE PRODUCT TYPE: ICD-10-CM

## 2024-04-18 DIAGNOSIS — E87.1 HYPONATREMIA: ICD-10-CM

## 2024-04-18 DIAGNOSIS — K92.1 BLOOD IN STOOL: ICD-10-CM

## 2024-04-18 DIAGNOSIS — D50.0 IRON DEFICIENCY ANEMIA DUE TO CHRONIC BLOOD LOSS: ICD-10-CM

## 2024-04-18 DIAGNOSIS — F10.20 ALCOHOL USE DISORDER, MODERATE, DEPENDENCE (H): ICD-10-CM

## 2024-04-18 LAB
ERYTHROCYTE [DISTWIDTH] IN BLOOD BY AUTOMATED COUNT: 16.4 % (ref 10–15)
HCT VFR BLD AUTO: 40.9 % (ref 40–53)
HGB BLD-MCNC: 13.1 G/DL (ref 13.3–17.7)
INR PPP: 1.06 (ref 0.85–1.15)
MCH RBC QN AUTO: 27.4 PG (ref 26.5–33)
MCHC RBC AUTO-ENTMCNC: 32 G/DL (ref 31.5–36.5)
MCV RBC AUTO: 86 FL (ref 78–100)
PLATELET # BLD AUTO: 255 10E3/UL (ref 150–450)
RBC # BLD AUTO: 4.78 10E6/UL (ref 4.4–5.9)
WBC # BLD AUTO: 7 10E3/UL (ref 4–11)

## 2024-04-18 PROCEDURE — 80076 HEPATIC FUNCTION PANEL: CPT | Performed by: FAMILY MEDICINE

## 2024-04-18 PROCEDURE — 36415 COLL VENOUS BLD VENIPUNCTURE: CPT | Performed by: FAMILY MEDICINE

## 2024-04-18 PROCEDURE — 85027 COMPLETE CBC AUTOMATED: CPT | Performed by: FAMILY MEDICINE

## 2024-04-18 PROCEDURE — 86664 EPSTEIN-BARR NUCLEAR ANTIGEN: CPT | Performed by: FAMILY MEDICINE

## 2024-04-18 PROCEDURE — G2211 COMPLEX E/M VISIT ADD ON: HCPCS | Performed by: FAMILY MEDICINE

## 2024-04-18 PROCEDURE — 99214 OFFICE O/P EST MOD 30 MIN: CPT | Performed by: FAMILY MEDICINE

## 2024-04-18 PROCEDURE — 85610 PROTHROMBIN TIME: CPT | Performed by: FAMILY MEDICINE

## 2024-04-18 RX ORDER — ESCITALOPRAM OXALATE 20 MG/1
20 TABLET ORAL DAILY
Qty: 90 TABLET | Refills: 0 | Status: SHIPPED | OUTPATIENT
Start: 2024-04-18 | End: 2024-08-02

## 2024-04-18 RX ORDER — NALTREXONE HYDROCHLORIDE 50 MG/1
50 TABLET, FILM COATED ORAL DAILY
Qty: 90 TABLET | Refills: 3 | Status: SHIPPED | OUTPATIENT
Start: 2024-04-18 | End: 2024-08-02

## 2024-04-18 RX ORDER — POLYETHYLENE GLYCOL 3350 17 G
2 POWDER IN PACKET (EA) ORAL
Qty: 48 LOZENGE | Refills: 1 | Status: SHIPPED | OUTPATIENT
Start: 2024-04-18

## 2024-04-18 NOTE — PROGRESS NOTES
Assessment & Plan   Alcoholic hepatitis without ascites (H28)  Improving.  - escitalopram (LEXAPRO) 20 MG tablet  Dispense: 90 tablet; Refill: 0  - Adult [facility] Referral  - Hepatic panel (Albumin, ALT, AST, Bili, Alk Phos, TP)  - INR    Alcohol use disorder, moderate, dependence (H)  One slip. But his pharmacist did not have naltrexone. We called his alternative pharmacy and they did have it in stock. We had reviewed the options again, and the oral naltrexone appeared appropriate.  - naltrexone (DEPADE/REVIA) 50 MG tablet  Dispense: 90 tablet; Refill: 3  - Adult [facility] Referral    Gastroesophageal reflux disease with esophagitis without hemorrhage    Hyponatremia    Nicotine dependence in remission, unspecified nicotine product type  - nicotine (COMMIT) 2 MG lozenge  Dispense: 48 lozenge; Refill: 1    Erythema multiforme  - EBV Nuclear Antigen EBNA Antibody IgG    Iron deficiency anemia due to chronic blood loss  - CBC with platelets    Blood in stool  - INR    Angel Sanchez is a 43 year old, presenting for the following health issues:  RECHECK      4/18/2024     2:56 PM   Additional Questions   Roomed by Андрей YO RN     Eating a lot a cereal and drinking a lot of milk. It was soothing before his EGD.  Had two beer Friday before a movie. No guilt or craving. Dreams about alcohol. Stopped smoking also-- has random craving of cigarette. Used a couple of his girlfriend's nicotine lozenges.  Could not get nalaxone at [facility]. It is on backorder.  Energy is fine. Tired a lot. Going to bed and getting up earlier.     History of Present Illness       Reason for visit:  Follow up endoscopy    He eats 2-3 servings of fruits and vegetables daily.He consumes 1 sweetened beverage(s) daily.He exercises with enough effort to increase his heart rate 10 to 19 minutes per day.  He exercises with enough effort to increase his heart rate 3 or less days per week.   He is taking medications  "regularly.       Objective    /58 (BP Location: Left arm, Patient Position: Sitting, Cuff Size: Adult Regular)   Pulse 86   Temp 97.6  F (36.4  C) (Tympanic)   Resp 20   Ht 1.803 m (5' 11\")   Wt 73.8 kg (162 lb 12.8 oz)   SpO2 98%   BMI 22.71 kg/m    Body mass index is 22.71 kg/m .  Physical Exam   GENERAL: healthy, alert and no distress  EYES: grossly normal to inspection, and conjunctivae and sclerae normal  RESP: breathing unlabored, no cough or throat clearing.  MS: no gross musculoskeletal defects noted.  SKIN: no suspicious lesions or rashes visible  NEURO: Normal strength and tone, mentation intact and speech normal  PSYCH: mentation appears normal, affect normal/bright     Results for orders placed or performed in visit on 04/18/24   EBV Nuclear Antigen EBNA Antibody IgG     Status: Normal   Result Value Ref Range    EBV Nuclear Ag Kiley IgG Instrument Value <3.0 <18.0 U/mL    EBV Nuclear Antigen Antibody IgG No detectable antibody. No detectable antibody.   CBC with platelets     Status: Abnormal   Result Value Ref Range    WBC Count 7.0 4.0 - 11.0 10e3/uL    RBC Count 4.78 4.40 - 5.90 10e6/uL    Hemoglobin 13.1 (L) 13.3 - 17.7 g/dL    Hematocrit 40.9 40.0 - 53.0 %    MCV 86 78 - 100 fL    MCH 27.4 26.5 - 33.0 pg    MCHC 32.0 31.5 - 36.5 g/dL    RDW 16.4 (H) 10.0 - 15.0 %    Platelet Count 255 150 - 450 10e3/uL   Hepatic panel (Albumin, ALT, AST, Bili, Alk Phos, TP)     Status: Abnormal   Result Value Ref Range    Protein Total 7.2 6.4 - 8.3 g/dL    Albumin 4.4 3.5 - 5.2 g/dL    Bilirubin Total 0.4 <=1.2 mg/dL    Alkaline Phosphatase 74 40 - 150 U/L    AST 55 (H) 0 - 45 U/L    ALT 53 0 - 70 U/L    Bilirubin Direct <0.20 0.00 - 0.30 mg/dL   INR     Status: Normal   Result Value Ref Range    INR 1.06 0.85 - 1.15   Signed Electronically by: MICHAEL WHYTE MD    "

## 2024-04-19 LAB
ALBUMIN SERPL BCG-MCNC: 4.4 G/DL (ref 3.5–5.2)
ALP SERPL-CCNC: 74 U/L (ref 40–150)
ALT SERPL W P-5'-P-CCNC: 53 U/L (ref 0–70)
AST SERPL W P-5'-P-CCNC: 55 U/L (ref 0–45)
BILIRUB DIRECT SERPL-MCNC: <0.2 MG/DL (ref 0–0.3)
BILIRUB SERPL-MCNC: 0.4 MG/DL
EBV NA IGG SER IA-ACNC: <3 U/ML
EBV NA IGG SER IA-ACNC: NORMAL [IU]/ML
PROT SERPL-MCNC: 7.2 G/DL (ref 6.4–8.3)

## 2024-04-23 ENCOUNTER — TELEPHONE (OUTPATIENT)
Dept: GASTROENTEROLOGY | Facility: CLINIC | Age: 44
End: 2024-04-23
Payer: COMMERCIAL

## 2024-04-23 NOTE — TELEPHONE ENCOUNTER
Endoscopy Scheduling Screen    Have you had a positive Covid test in the last 14 days?  No    What is your communication preference for Instructions and/or Bowel Prep?   MyChart    What insurance is in the chart?  Other:  [facility]    Ordering/Referring Provider: Brandy   (If ordering provider performs procedure, schedule with ordering provider unless otherwise instructed. )      Scheduling based on questionnaire from 01/09/2024      Final Scheduling Details     Procedure scheduled  Upper endoscopy (EGD)    Surgeon:  Brandy     Date of procedure:  08/14/2024     Pre-OP / PAC:   No - Not required for this site.    Location  [facility] - Per order.    Sedation   Moderate Sedation - Per order.      Patient Reminders:   You will receive a call from a Nurse to review instructions and health history.  This assessment must be completed prior to your procedure.  Failure to complete the Nurse assessment may result in the procedure being cancelled.      On the day of your procedure, please designate an adult(s) who can drive you home stay with you for the next 24 hours. The medicines used in the exam will make you sleepy. You will not be able to drive.      You cannot take public transportation, ride share services, or non-medical taxi service without a responsible caregiver.  Medical transport services are allowed with the requirement that a responsible caregiver will receive you at your destination.  We require that drivers and caregivers are confirmed prior to your procedure.

## 2024-05-23 ENCOUNTER — TELEPHONE (OUTPATIENT)
Dept: SURGERY | Facility: CLINIC | Age: 44
End: 2024-05-23
Payer: COMMERCIAL

## 2024-05-23 NOTE — TELEPHONE ENCOUNTER
[facility] Call Center    Phone Message    May a detailed message be left on voicemail: yes     Reason for Call: Other: Patient reporting severe anal pain with Hemorrhoids for 72 hours +. Please call patient to discuss.      Action Taken: Message routed to:  [facility] ([facility] STEFANIA    Travel Screening: Not Applicable

## 2024-05-24 NOTE — TELEPHONE ENCOUNTER
Patient called for rectal bleeding for knojwn hemorrhoids.  He said this has been there for >2 weeks.   He had a colonoscopy recently where he discussed hemorrhoid surgery with Dr Nava  Will schedule with Dr Marc on 6/5 to discuss surgery

## 2024-05-27 NOTE — PROGRESS NOTES
Colon and Rectal Surgery Clinic Note    RE: Daniel Sharma.  : 1980.  BIBIANA: 2024.    Reason for visit: Hemorrhoids wants to discuss surgery.    HPI: 43 year old male with history of alcohol use disorder and chronic opioid use. Patient called into our nursing line stating he has had rectal bleeding for over 2 weeks and had a colonoscopy 4/3/2024 where he discussed hemorrhoid surgery with Dr. Nava.  Hgb: 13.1 (2024)  INR: 1.06 (24)     Today, Daniel is feeling fine given his symptoms are improving.  He notes intermittent flares of swelling and pain.  He notes protrusion  which he will manually reduce. He will have occasional rectal bleeding.  His diarrhea is presently under better control leading to improvement of his symptoms.    Colonoscopy (4/3/2024) (done due to rectal bleeding and diarrhea)  Impression:                         - Hemorrhoids found on perianal exam.                          - The examined portion of the ileum was normal.                          - Normal mucosa in the entire examined colon. Biopsied.                          - Non-bleeding internal hemorrhoids.   PATHOLOGY:   B. COLON, RANDOM BIOPSY:  Colonic mucosa with no evidence of microscopic or chronic colitis or other significant histologic abnormality     EGD (4/3/24)     Impression:            - LA Grade D esophagitis with no bleeding. Shallow                          ulceration approximately 75% circumferential for a 10                          cm length. The appearance suggests significent                          healing/reepithelialization of the adjacent mucosa.                          Cannot exclude Garvin's. Biopsied to exclude viral                          etiologies given the atypical appearance.                          - Benign-appearing esophageal stenosis. Dilated.                          - Gastroesophageal flap valve classified as Hill Grade                          IV (no fold, wide open lumen,  hiatal hernia present).                          - Normal stomach.                          - Normal examined duodenum.   PATHOLOGY:  A. ESOPHAGUS, BIOPSY OF ULCER:  Erosive esophagitis with ulceration; no dysplasia or malignancy  (See Comment)    Medical history:  Alcoholic hepatitis without ascites   Alcohol use disorder   Gastroesophageal reflux disease with esophagitis without hemorrhage   Erythema multiforme     Surgical history:  Past Surgical History:   Procedure Laterality Date    COLONOSCOPY N/A 4/3/2024    Procedure: Colonoscopy;  Surgeon: Pranav Nava MD;  [facility]: [facility]    COLONOSCOPY N/A 4/3/2024    Procedure: COLONOSCOPY, WITH POLYPECTOMY AND BIOPSY;  Surgeon: Pranav Nava MD;  Location:  [facility]    ESOPHAGOSCOPY, GASTROSCOPY, DUODENOSCOPY (EGD), COMBINED N/A 4/3/2024    Procedure: ESOPHAGOGASTRODUODENOSCOPY, WITH BIOPSY;  Surgeon: Pranav Nava MD;  Location: [facility]       Family history:  Family History   Problem Relation Age of Onset    Breast Cancer Mother 60    Myocardial Infarction Father 40        and 43 yo    Alcoholism Father     Impaired Fasting Glucose Father 68    Multiple Sclerosis Sister     Colon Cancer Maternal Grandmother     Alcoholism Maternal Grandfather     Myocardial Infarction Maternal Grandfather 60        300#    Alzheimer Disease Paternal Grandmother          at 88 y    Alcoholism Paternal Grandfather         lived to 90's       Medications:  Current Outpatient Medications   Medication Sig Dispense Refill    escitalopram (LEXAPRO) 20 MG tablet Take 1 tablet (20 mg) by mouth daily 90 tablet 0    naltrexone (DEPADE/REVIA) 50 MG tablet Take 1 tablet (50 mg) by mouth daily 90 tablet 3    nicotine (COMMIT) 2 MG lozenge Place 1 lozenge (2 mg) inside cheek every hour as needed for nicotine withdrawal symptoms 48 lozenge 1    ondansetron (ZOFRAN ODT) 8 MG ODT tab Place 8 mg under the tongue      pantoprazole (PROTONIX) 40 MG EC tablet TAKE 1 TABLET(40 MG) BY  MOUTH DAILY 90 tablet 2       Allergies:  No Known Allergies    Social history:   Social History     Tobacco Use    Smoking status: Former     Current packs/day: 0.00     Average packs/day: 1 pack/day for 21.2 years (21.2 ttl pk-yrs)     Types: Cigarettes     Start date: 2003     Quit date: 3/10/2024     Years since quittin.2     Passive exposure: Never    Smokeless tobacco: Not on file   Substance Use Topics    Alcohol use: Not on file     Marital status: single.  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  General: Well hydrated. No acute distress.    Perianal external examination: Performed with Dann Terry MA     Perianal skin: Intact with no excoriation or lichenification.  Lesions: Right anterior tag ? Condyloma, single lesion  Eversion of buttocks: There was not evidence of an anal fissure. Details: N/A.  Skin tags or external hemorrhoids: Yes: Stigmata of prolapse on left.  Digital rectal examination: Was performed.   Sphincter tone: Good.  Palpable lesions: No.    Anoscopy: Was performed.   Hemorrhoids: Yes. Moderate to large, with stigmata of prolapse, largest on left lateral  Lesions: No      ASSESSMENT/PLAN    Hemorrhoidal disease - intermittent thrombosis described.  We reviewed this best addressed when acute thrombosed hemorrhoid, and to call our office if this occurs.  Better control of diarrhea and avoiding straining may prevent recurrences.    Hemorrhoidal prolapse and bleeding also can improve with better control of diarrhea and straining.  We reviewed options of hemorrhoid banding and how this may need multiple visits but less time from work.  Reviewed both excisional hemorrhoidectomy with 90-95% success rate but he may need up to 4 weeks off from work, vs. THD with hemorrhoidopexy with 80-85% success rate and probably 2 weeks off from work.    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  He also has a single external lesion, possible condyloma, which would benefit treatment/excision.  No procedures done today given he needed to return to work.    30 minutes spent on the date of encounter performing chart review, history and exam, documentation and further activities as noted above with an additional one minute for anoscopy.     Ira Marc MD     Division of Colon & Rectal Surgery  [facility]         Referring Provider:  No referring provider defined for this encounter.     Primary Care Provider:  Shawna Ellison

## 2024-05-28 NOTE — TELEPHONE ENCOUNTER
Diagnosis, Referred by & from: Pranav Nava MD    Appt date: 5/30/2024   NOTES STATUS DETAILS   OFFICE NOTE from referring provider Internal 4/18/2024, 3/20/2024 , 12/8/2023 OV with GUZMAN Ellison   OFFICE NOTE from other specialist N/A    ANAL PAP/CEA N/A    BIOPSIES/PATHOLOGY RELATED TO DIAGNOSIS Internal 4/3/2024   Case: [id number]    DIAGNOSTIC PROCEDURES     PFC TESTING (from the Pelvic floor center includes Manometry, PDNL, EMG, etc.) N/A    COLONOSCOPY (most recent all time after 5 years) Internal 4/3/2024   FLEX SIGMOIDOSCOPY N/A    UPPER ENDOSCOPY (EGD) Internal 4/3/2024   ERCP N/A    IMAGING (DISC & REPORT)      CT In process 3/12/2024 CT ABD PEL - [facility]   XRAY In process 3/17/2024 XR CHEST - [facility]  3/13/2024 XR CHEST - [facility]   ULTRASOUND  (ENDOANAL/ENDORECTAL) In process 3/17/2024 US ABD  - [facility]     Records Requested  05/28/24    Facility  [facility]  Fax: [phone]   Outcome Request sent to [facility] for imaging     - Images have been receieved, resolved in PACS

## 2024-05-30 ENCOUNTER — PRE VISIT (OUTPATIENT)
Dept: SURGERY | Facility: CLINIC | Age: 44
End: 2024-05-30

## 2024-05-30 ENCOUNTER — OFFICE VISIT (OUTPATIENT)
Dept: SURGERY | Facility: CLINIC | Age: 44
End: 2024-05-30
Payer: COMMERCIAL

## 2024-05-30 VITALS
HEART RATE: 89 BPM | WEIGHT: 163.5 LBS | OXYGEN SATURATION: 100 % | DIASTOLIC BLOOD PRESSURE: 87 MMHG | BODY MASS INDEX: 22.8 KG/M2 | SYSTOLIC BLOOD PRESSURE: 131 MMHG

## 2024-05-30 DIAGNOSIS — K64.2 HEMORRHOIDS THAT PROLAPSE WITH STRAINING AND REQUIRE MANUAL REPLACEMENT BACK INSIDE ANAL CANAL: Primary | ICD-10-CM

## 2024-05-30 PROCEDURE — 46600 DIAGNOSTIC ANOSCOPY SPX: CPT | Performed by: COLON & RECTAL SURGERY

## 2024-05-30 PROCEDURE — 99203 OFFICE O/P NEW LOW 30 MIN: CPT | Mod: 25 | Performed by: COLON & RECTAL SURGERY

## 2024-05-30 ASSESSMENT — PAIN SCALES - GENERAL: PAINLEVEL: NO PAIN (0)

## 2024-05-30 NOTE — LETTER
2024         RE: Daniel Sharma  [address] [place] [address]        Dear Colleague,    Thank you for referring your patient, Daniel Sharma, to the [facility] [place]. Please see a copy of my visit note below.    Colon and Rectal Surgery Clinic Note    RE: Daniel Sharma.  : 1980.  B[place]: 2024.    Reason for visit: Hemorrhoids wants to discuss surgery.    HPI: 43 year old male with history of alcohol use disorder and chronic opioid use. Patient called into our nursing line stating he has had rectal bleeding for over 2 weeks and had a colonoscopy 4/3/2024 where he discussed hemorrhoid surgery with Dr. Nava.  Hgb: 13.1 (2024)  INR: 1.06 (24)     Today, Daniel is feeling fine given his symptoms are improving.  He notes intermittent flares of swelling and pain.  He notes protrusion  which he will manually reduce. He will have occasional rectal bleeding.  His diarrhea is presently under better control leading to improvement of his symptoms.    Colonoscopy (4/3/2024) (done due to rectal bleeding and diarrhea)  Impression:                         - Hemorrhoids found on perianal exam.                          - The examined portion of the ileum was normal.                          - Normal mucosa in the entire examined colon. Biopsied.                          - Non-bleeding internal hemorrhoids.   PATHOLOGY:   B. COLON, RANDOM BIOPSY:  Colonic mucosa with no evidence of microscopic or chronic colitis or other significant histologic abnormality     EGD (4/3/24)     Impression:            - LA Grade D esophagitis with no bleeding. Shallow                          ulceration approximately 75% circumferential for a 10                          cm length. The appearance suggests significent                          healing/reepithelialization of the adjacent mucosa.                          Cannot exclude Garvin's. Biopsied to exclude viral                           etiologies given the atypical appearance.                          - Benign-appearing esophageal stenosis. Dilated.                          - Gastroesophageal flap valve classified as Hill Grade                          IV (no fold, wide open lumen, hiatal hernia present).                          - Normal stomach.                          - Normal examined duodenum.   PATHOLOGY:  A. ESOPHAGUS, BIOPSY OF ULCER:  Erosive esophagitis with ulceration; no dysplasia or malignancy  (See Comment)    Medical history:  Alcoholic hepatitis without ascites   Alcohol use disorder   Gastroesophageal reflux disease with esophagitis without hemorrhage   Erythema multiforme     Surgical history:  Past Surgical History:   Procedure Laterality Date     COLONOSCOPY N/A 4/3/2024    Procedure: Colonoscopy;  Surgeon: Pranav Nava MD;  Location:  GI     COLONOSCOPY N/A 4/3/2024    Procedure: COLONOSCOPY, WITH POLYPECTOMY AND BIOPSY;  Surgeon: Pranav Nava MD;  Location:  GI     ESOPHAGOSCOPY, GASTROSCOPY, DUODENOSCOPY (EGD), COMBINED N/A 4/3/2024    Procedure: ESOPHAGOGASTRODUODENOSCOPY, WITH BIOPSY;  Surgeon: Pranav Nava MD;  Location:  GI       Family history:  Family History   Problem Relation Age of Onset     Breast Cancer Mother 60     Myocardial Infarction Father 40        and 41 yo     Alcoholism Father      Impaired Fasting Glucose Father 68     Multiple Sclerosis Sister      Colon Cancer Maternal Grandmother      Alcoholism Maternal Grandfather      Myocardial Infarction Maternal Grandfather 60        300#     Alzheimer Disease Paternal Grandmother          at 88 y     Alcoholism Paternal Grandfather         lived to 90's       Medications:  Current Outpatient Medications   Medication Sig Dispense Refill     escitalopram (LEXAPRO) 20 MG tablet Take 1 tablet (20 mg) by mouth daily 90 tablet 0     naltrexone (DEPADE/REVIA) 50 MG tablet Take 1 tablet (50 mg) by mouth daily 90 tablet 3      nicotine (COMMIT) 2 MG lozenge Place 1 lozenge (2 mg) inside cheek every hour as needed for nicotine withdrawal symptoms 48 lozenge 1     ondansetron (ZOFRAN ODT) 8 MG ODT tab Place 8 mg under the tongue       pantoprazole (PROTONIX) 40 MG EC tablet TAKE 1 TABLET(40 MG) BY MOUTH DAILY 90 tablet 2       Allergies:  No Known Allergies    Social history:   Social History     Tobacco Use     Smoking status: Former     Current packs/day: 0.00     Average packs/day: 1 pack/day for 21.2 years (21.2 ttl pk-yrs)     Types: Cigarettes     Start date: 2003     Quit date: 3/10/2024     Years since quittin.2     Passive exposure: Never     Smokeless tobacco: Not on file   Substance Use Topics     Alcohol use: Not on file     Marital status: single.  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  General: Well hydrated. No acute distress.    Perianal external examination: Performed with Dann Terry MA     Perianal skin: Intact with no excoriation or lichenification.  Lesions: Right anterior tag ? Condyloma, single lesion  Eversion of buttocks: There was not evidence of an anal fissure. Details: N/A.  Skin tags or external hemorrhoids: Yes: Stigmata of prolapse on left.  Digital rectal examination: Was performed.   Sphincter tone: Good.  Palpable lesions: No.    Anoscopy: Was performed.   Hemorrhoids: Yes. Moderate to large, with stigmata of prolapse, largest on left lateral  Lesions: No      ASSESSMENT/PLAN    Hemorrhoidal disease - intermittent thrombosis described.  We reviewed this best addressed when acute thrombosed hemorrhoid, and to call our office if this occurs.  Better control of diarrhea and avoiding straining may prevent recurrences.    Hemorrhoidal prolapse and bleeding also can improve with better control of diarrhea and straining.  We reviewed options of hemorrhoid banding and how this  may need multiple visits but less time from work.  Reviewed both excisional hemorrhoidectomy with 90-95% success rate but he may need up to 4 weeks off from work, vs. THD with hemorrhoidopexy with 80-85% success rate and probably 2 weeks off from work.    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  He also has a single external lesion, possible condyloma, which would benefit treatment/excision.  No procedures done today given he needed to return to work.    30 minutes spent on the date of encounter performing chart review, history and exam, documentation and further activities as noted above with an additional one minute for anoscopy.     Ira Marc MD     Division of Colon & Rectal Surgery  [facility]         Referring Provider:  No referring provider defined for this encounter.     Primary Care Provider:  Shawna Ellison       Again, thank you for allowing me to participate in the care of your patient.        Sincerely,        Ira Marc MD

## 2024-05-30 NOTE — NURSING NOTE
Chief Complaint   Patient presents with    New Patient      Hemorrhoids.  discuss surgery       Vitals:    05/30/24 0821   BP: 131/87   BP Location: Right arm   Patient Position: Sitting   Cuff Size: Adult Regular   Pulse: 89   SpO2: 100%   Weight: 74.2 kg (163 lb 8 oz)       Body mass index is 22.8 kg/m .      Dann Terry MA

## 2024-05-30 NOTE — PATIENT INSTRUCTIONS
If your hemorrhoids flare (symptoms include hard nodule on the outside, extreme anal pain) please call the clinic so we can get you in for a clinic procedure     Hemorrhoid banding (which is done in the clinic) is an option for your hemorrhoid prolapse. You can call to schedule a follow up visit for this if you decide to move forward     Hemorrhoidectomy   The two options are a sutured hemorrhoidectomy (the hemorrhoids are removed and repaired with sutures)   This would be four week off of work     THD   Hemorrhoids are removed via ultrasound.  This would be two weeks off of work     Clinic: [phone]

## 2024-06-30 ENCOUNTER — PATIENT OUTREACH (OUTPATIENT)
Dept: CARE COORDINATION | Facility: CLINIC | Age: 44
End: 2024-06-30
Payer: COMMERCIAL

## 2024-08-02 ENCOUNTER — VIRTUAL VISIT (OUTPATIENT)
Dept: FAMILY MEDICINE | Facility: CLINIC | Age: 44
End: 2024-08-02
Payer: COMMERCIAL

## 2024-08-02 DIAGNOSIS — F10.20 ALCOHOL USE DISORDER, MODERATE, DEPENDENCE (H): ICD-10-CM

## 2024-08-02 DIAGNOSIS — K70.10 ALCOHOLIC HEPATITIS WITHOUT ASCITES (H): ICD-10-CM

## 2024-08-02 DIAGNOSIS — B37.0 THRUSH: Primary | ICD-10-CM

## 2024-08-02 DIAGNOSIS — E87.1 HYPONATREMIA: ICD-10-CM

## 2024-08-02 DIAGNOSIS — K21.00 GASTROESOPHAGEAL REFLUX DISEASE WITH ESOPHAGITIS WITHOUT HEMORRHAGE: ICD-10-CM

## 2024-08-02 PROCEDURE — G2211 COMPLEX E/M VISIT ADD ON: HCPCS | Mod: 95 | Performed by: FAMILY MEDICINE

## 2024-08-02 PROCEDURE — 99214 OFFICE O/P EST MOD 30 MIN: CPT | Mod: 95 | Performed by: FAMILY MEDICINE

## 2024-08-02 RX ORDER — FLUCONAZOLE 100 MG/1
100 TABLET ORAL DAILY
Qty: 15 TABLET | Refills: 0 | Status: SHIPPED | OUTPATIENT
Start: 2024-08-02 | End: 2024-08-17

## 2024-08-02 RX ORDER — ESCITALOPRAM OXALATE 10 MG/1
10 TABLET ORAL DAILY
Qty: 90 TABLET | Refills: 3 | Status: SHIPPED | OUTPATIENT
Start: 2024-08-02

## 2024-08-02 RX ORDER — PANTOPRAZOLE SODIUM 40 MG/1
40 TABLET, DELAYED RELEASE ORAL DAILY
Qty: 90 TABLET | Refills: 2 | Status: CANCELLED | OUTPATIENT
Start: 2024-08-02

## 2024-08-02 RX ORDER — ONDANSETRON 8 MG/1
8 TABLET, ORALLY DISINTEGRATING ORAL EVERY 8 HOURS PRN
Qty: 20 TABLET | Refills: 0 | Status: SHIPPED | OUTPATIENT
Start: 2024-08-02

## 2024-08-02 RX ORDER — NALTREXONE HYDROCHLORIDE 50 MG/1
50 TABLET, FILM COATED ORAL DAILY
Qty: 90 TABLET | Refills: 3 | Status: SHIPPED | OUTPATIENT
Start: 2024-08-02

## 2024-08-02 RX ORDER — PANTOPRAZOLE SODIUM 40 MG/1
40 TABLET, DELAYED RELEASE ORAL DAILY
Qty: 90 TABLET | Refills: 3 | Status: SHIPPED | OUTPATIENT
Start: 2024-08-02

## 2024-08-02 NOTE — PROGRESS NOTES
"Daniel is a 44 year old who is being evaluated via a billable video visit.    How would you like to obtain your AVS? MyChart  If the video visit is dropped, the invitation should be resent by: Text to cell phone: [phone]  Will anyone else be joining your video visit? No      Assessment & Plan   Gastroesophageal reflux disease with esophagitis without hemorrhage  - ondansetron (ZOFRAN ODT) 8 MG ODT tab  Dispense: 20 tablet; Refill: 0  - pantoprazole (PROTONIX) 40 MG EC tablet  Dispense: 90 tablet; Refill: 3    Alcoholic hepatitis without ascites (H28)  He has been reading about sobriety.  - escitalopram (LEXAPRO) 10 MG tablet  Dispense: 90 tablet; Refill: 3  - Comprehensive metabolic panel (BMP + Alb, Alk Phos, ALT, AST, Total. Bili, TP)    Alcohol use disorder, moderate, dependence (H)  - naltrexone (DEPADE/REVIA) 50 MG tablet  Dispense: 90 tablet; Refill: 3    Thrush  - fluconazole (DIFLUCAN) 100 MG tablet  Dispense: 15 tablet; Refill: 0  Consider lichen planus if the diflucan does not work.    Hyponatremia  - Comprehensive metabolic panel (BMP + Alb, Alk Phos, ALT, AST, Total. Bili, TP)  The longitudinal plan of care for the diagnosis(es)/condition(s) as documented were addressed during this visit.     Return in 3-4 weeks.  Due to the added complexity in care, I will continue to support Daniel in the subsequent management and with ongoing continuity of care.  Subjective   Daniel is a 44 year old, presenting for the following health issues:  Follow Up (3 month follow up : has another endoscopy in August. /Date: (8/14/24)/Location: Esophagoscopy, gastroscopy, duodenoscopy (EGD), combined/Provider: Pranav Nava MD) and Refill Request (Pt requesting refills and would like to Lexpro and lowering doseage as pt states that it made him feel \"loopy\")        8/2/2024     1:23 PM   Additional Questions   Roomed by Savannah   Off of all the medicines.  Pantoprazole works best for him.  In June, he slipped when a " friend got a brain tumor and went on hospice. That involved spending a lot of time with old friends that lead him back alcohol and tobacco.  He would like to try escitalopram at a lower dose. He states that he is just sad. Sleeping okay.   He has some back issues because he is sleeping on the couch where the air conditioner is. Getting up to urinate is disrupting sleeping.  He has gone two days without smoking, then smoked a few last night.   Drank 3 last night and smoked 3. Wants to get back to recovery.    History of Present Illness       Reason for visit:  Follow up / medication review and refills    He eats 2-3 servings of fruits and vegetables daily.He consumes 1 sweetened beverage(s) daily.He exercises with enough effort to increase his heart rate 10 to 19 minutes per day.  He exercises with enough effort to increase his heart rate 3 or less days per week. He is missing 7 dose(s) of medications per week.  He is not taking prescribed medications regularly due to side effects and other.       Objective    Vitals - Patient Reported  Weight (Patient Reported): 75.2 kg (165 lb 12.8 oz)  Pain Score: Moderate Pain (4)  Pain Loc: Low Back (lower back)  Vitals:  No vitals were obtained today due to virtual visit.  Physical Exam   GENERAL: alert and no distress  EYES: Eyes grossly normal to inspection.  No discharge or erythema, or obvious scleral/conjunctival abnormalities.  RESP: No audible wheeze, cough, or visible cyanosis.    SKIN: Visible skin clear. No significant rash, abnormal pigmentation or lesions.  NEURO: Cranial nerves grossly intact.  Mentation and speech appropriate for age.  PSYCH: Appropriate affect, tone, and pace of words    Office Visit on 04/18/2024   Component Date Value Ref Range Status    EBV Nuclear Ag Kiley IgG Instrument * 04/18/2024 <3.0  <18.0 U/mL Final    EBV Nuclear Antigen Antibody IgG 04/18/2024 No detectable antibody.  No detectable antibody. Final    WBC Count 04/18/2024 7.0  4.0 -  11.0 10e3/uL Final    RBC Count 04/18/2024 4.78  4.40 - 5.90 10e6/uL Final    Hemoglobin 04/18/2024 13.1 (L)  13.3 - 17.7 g/dL Final    Hematocrit 04/18/2024 40.9  40.0 - 53.0 % Final    MCV 04/18/2024 86  78 - 100 fL Final    MCH 04/18/2024 27.4  26.5 - 33.0 pg Final    MCHC 04/18/2024 32.0  31.5 - 36.5 g/dL Final    RDW 04/18/2024 16.4 (H)  10.0 - 15.0 % Final    Platelet Count 04/18/2024 255  150 - 450 10e3/uL Final    Protein Total 04/18/2024 7.2  6.4 - 8.3 g/dL Final    Albumin 04/18/2024 4.4  3.5 - 5.2 g/dL Final    Bilirubin Total 04/18/2024 0.4  <=1.2 mg/dL Final    Alkaline Phosphatase 04/18/2024 74  40 - 150 U/L Final    Reference intervals for this test were updated on 11/14/2023 to more accurately reflect our healthy population. There may be differences in the flagging of prior results with similar values performed with this method. Interpretation of those prior results can be made in the context of the updated reference intervals.    AST 04/18/2024 55 (H)  0 - 45 U/L Final    Reference intervals for this test were updated on 6/12/2023 to more accurately reflect our healthy population. There may be differences in the flagging of prior results with similar values performed with this method. Interpretation of those prior results can be made in the context of the updated reference intervals.    ALT 04/18/2024 53  0 - 70 U/L Final    Reference intervals for this test were updated on 6/12/2023 to more accurately reflect our healthy population. There may be differences in the flagging of prior results with similar values performed with this method. Interpretation of those prior results can be made in the context of the updated reference intervals.      Bilirubin Direct 04/18/2024 <0.20  0.00 - 0.30 mg/dL Final    INR 04/18/2024 1.06  0.85 - 1.15 Final     Video-Visit Details  Type of service:  Video Visit 1:48:04 pm to 2:15:30 pm. Total: 27 minutes 25 seconds .  Originating Location (pt. Location):  Home    Distant Location (provider location):  On-site  Platform used for Video Visit: Angelica  Signed Electronically by: MICHAEL WHYTE MD

## 2024-08-06 ENCOUNTER — TELEPHONE (OUTPATIENT)
Dept: GASTROENTEROLOGY | Facility: CLINIC | Age: 44
End: 2024-08-06
Payer: COMMERCIAL

## 2024-08-06 NOTE — TELEPHONE ENCOUNTER
Pre assessment completed for upcoming procedure.   (Please see previous telephone encounter notes for complete details)       Procedure details:    Arrival time and facility location reviewed.    Pre op exam needed? No.    Designated  policy reviewed. Instructed to have someone stay 6  hours post procedure.       Medication review:    Medications reviewed. Please see supporting documentation below. Holding recommendations discussed (if applicable).   Naltrexone: HOLD 3 days before procedure.   NSAID medication(s): Naproxen (Aleve, Naprosyn): HOLD 4 days before procedure.       Prep for procedure:     Procedure prep instructions reviewed.        Any additional information needed:  N/A      Patient  verbalized understanding and had no questions or concerns at this time.      Camilla Santana RN  Endoscopy Procedure Pre Assessment   [phone] option 4

## 2024-08-06 NOTE — TELEPHONE ENCOUNTER
Pre visit planning completed.      Procedure details:    Patient scheduled for Upper endoscopy (EGD) on 8/14/2024.     Arrival time: 1430. Procedure time 1530    Facility location: [facility]; [address] [place] [address], [address] Floor, [place] [address]. Check in location: Main entrance at registration desk.    Sedation type: Conscious sedation     Pre op exam needed? No.    Indication for procedure: Gastroesophageal reflux disease with esophagitis without hemorrhage [K21.00]       Chart review:     Electronic implanted devices? No    Recent diagnosis of diverticulitis within the last 6 weeks? N/A      Medication review:    Diabetic? No    Anticoagulants? No    Weight loss medication/injectable? No.    NSAIDS? No NSAID medications per patient's medication list.  RN will verify with pre-assessment call.    Other medication HOLDING recommendations:  Naltrexone PO: HOLD 3 days before procedure.       Prep for procedure:     Prep instructions sent via [facility] [facility] [place] Santana RN  Endoscopy Procedure Pre Assessment RN  [phone] option 4

## 2024-08-14 ENCOUNTER — HOSPITAL ENCOUNTER (OUTPATIENT)
Facility: CLINIC | Age: 44
Discharge: HOME | End: 2024-08-14
Attending: INTERNAL MEDICINE | Admitting: INTERNAL MEDICINE
Payer: COMMERCIAL

## 2024-08-14 VITALS
RESPIRATION RATE: 12 BRPM | OXYGEN SATURATION: 95 % | DIASTOLIC BLOOD PRESSURE: 85 MMHG | HEART RATE: 88 BPM | SYSTOLIC BLOOD PRESSURE: 115 MMHG

## 2024-08-14 LAB — UPPER GI ENDOSCOPY: NORMAL

## 2024-08-14 PROCEDURE — 250N000011 HC RX IP 250 OP 636: Performed by: INTERNAL MEDICINE

## 2024-08-14 PROCEDURE — 250N000009 HC RX 250: Performed by: INTERNAL MEDICINE

## 2024-08-14 PROCEDURE — 43235 EGD DIAGNOSTIC BRUSH WASH: CPT | Performed by: INTERNAL MEDICINE

## 2024-08-14 PROCEDURE — G0500 MOD SEDAT ENDO SERVICE >5YRS: HCPCS | Performed by: INTERNAL MEDICINE

## 2024-08-14 RX ORDER — NALOXONE HYDROCHLORIDE 0.4 MG/ML
0.2 INJECTION, SOLUTION INTRAMUSCULAR; INTRAVENOUS; SUBCUTANEOUS
Status: CANCELLED | OUTPATIENT
Start: 2024-08-14

## 2024-08-14 RX ORDER — NALOXONE HYDROCHLORIDE 0.4 MG/ML
0.4 INJECTION, SOLUTION INTRAMUSCULAR; INTRAVENOUS; SUBCUTANEOUS
Status: CANCELLED | OUTPATIENT
Start: 2024-08-14

## 2024-08-14 RX ORDER — FLUMAZENIL 0.1 MG/ML
0.2 INJECTION, SOLUTION INTRAVENOUS
Status: CANCELLED | OUTPATIENT
Start: 2024-08-14 | End: 2024-08-15

## 2024-08-14 RX ORDER — ONDANSETRON 2 MG/ML
4 INJECTION INTRAMUSCULAR; INTRAVENOUS EVERY 6 HOURS PRN
Status: CANCELLED | OUTPATIENT
Start: 2024-08-14

## 2024-08-14 RX ORDER — PROCHLORPERAZINE MALEATE 5 MG
10 TABLET ORAL EVERY 6 HOURS PRN
Status: CANCELLED | OUTPATIENT
Start: 2024-08-14

## 2024-08-14 RX ORDER — ONDANSETRON 4 MG/1
4 TABLET, ORALLY DISINTEGRATING ORAL EVERY 6 HOURS PRN
Status: CANCELLED | OUTPATIENT
Start: 2024-08-14

## 2024-08-14 RX ORDER — FENTANYL CITRATE 50 UG/ML
INJECTION, SOLUTION INTRAMUSCULAR; INTRAVENOUS PRN
Status: DISCONTINUED | OUTPATIENT
Start: 2024-08-14 | End: 2024-08-14 | Stop reason: HOSPADM

## 2024-08-14 ASSESSMENT — ACTIVITIES OF DAILY LIVING (ADL)
ADLS_ACUITY_SCORE: 35

## 2024-08-14 NOTE — H&P
Gastroenterology Pre-op History and Physical    Daniel Sharma MRN# [id number]   Age: 44 year old YOB: 1980      Date of Surgery: 08/14/24  Location [facility]      Date of Exam 8/14/2024 Facility Same Day       Primary care provider: Shawna Ellison         Chief Complaint and/or Reason for Procedure:   45 yo male for follow-up EGD after prior EGD showed LA grade D esophagitis and required dilation. Excluded Garvin's.    Symptoms much improved. No dysphagia. Minimal heartburn on PPI.         Past Medical and Surgical History:   GERD and esohagitis    History reviewed. No pertinent past medical history.  Past Surgical History:   Procedure Laterality Date    COLONOSCOPY N/A 4/3/2024    Procedure: Colonoscopy;  Surgeon: Pranav Nava MD;  Location:  GI    COLONOSCOPY N/A 4/3/2024    Procedure: COLONOSCOPY, WITH POLYPECTOMY AND BIOPSY;  Surgeon: Pranav Nava MD;  Location:  GI    ESOPHAGOSCOPY, GASTROSCOPY, DUODENOSCOPY (EGD), COMBINED N/A 4/3/2024    Procedure: ESOPHAGOGASTRODUODENOSCOPY, WITH BIOPSY;  Surgeon: Pranav Nava MD;  Location:  GI            Medications (include herbals and vitamins):        Medications Prior to Admission   Medication Sig Dispense Refill Last Dose    escitalopram (LEXAPRO) 10 MG tablet Take 1 tablet (10 mg) by mouth daily Start with 1/2 pill for 4 days. 90 tablet 3 Past Week    fluconazole (DIFLUCAN) 100 MG tablet Take 1 tablet (100 mg) by mouth daily for 15 days 15 tablet 0 Past Week    naltrexone (DEPADE/REVIA) 50 MG tablet Take 1 tablet (50 mg) by mouth daily 90 tablet 3 Past Week    ondansetron (ZOFRAN ODT) 8 MG ODT tab Place 1 tablet (8 mg) under the tongue every 8 hours as needed for nausea 20 tablet 0 More than a month    pantoprazole (PROTONIX) 40 MG EC tablet Take 1 tablet (40 mg) by mouth daily 90 tablet 3 Past Week    nicotine (COMMIT) 2 MG lozenge Place 1 lozenge (2 mg) inside cheek every hour as  needed for nicotine withdrawal symptoms (Patient not taking: Reported on 5/30/2024) 48 lozenge 1              Allergies:    No Known Allergies            Physical Exam:   All vitals have been reviewed  Patient Vitals for the past 8 hrs:   BP Pulse Resp SpO2   08/14/24 1300 118/86 78 16 98 %     No intake/output data recorded.  Airway assessment:   Patient is able to open mouth wide  Patient is able to stick out tongue  Mallampatti classification: Class I (visualization of the soft palate, fauces, uvula, anterior and posterior pillars)}      Lungs:   No increased work of breathing, good air exchange, clear to auscultation bilaterally, no crackles or wheezing     Cardiovascular:   regular rate and rhythm and normal S1 and S2                 Anesthetic risk and/or ASA classification:     ASA 1    Pranav Nava MD

## 2024-08-14 NOTE — OR NURSING
Procedure: egd with no interventions  Sedation: conscious sedation   Specimens: none.   O2: ra  Tolerated procedure: well  Pt to recovery area in stable condition accompanied by RN, bedside report given to recovery RN  Other:  n/a    All belongings with patient at time of transfer.

## 2025-01-05 ENCOUNTER — HEALTH MAINTENANCE LETTER (OUTPATIENT)
Age: 45
End: 2025-01-05

## 2025-08-03 DIAGNOSIS — K21.00 GASTROESOPHAGEAL REFLUX DISEASE WITH ESOPHAGITIS WITHOUT HEMORRHAGE: ICD-10-CM

## 2025-08-03 DIAGNOSIS — K70.10 ALCOHOLIC HEPATITIS WITHOUT ASCITES (H): ICD-10-CM

## 2025-08-04 RX ORDER — PANTOPRAZOLE SODIUM 40 MG/1
40 TABLET, DELAYED RELEASE ORAL DAILY
Qty: 90 TABLET | Refills: 0 | Status: SHIPPED | OUTPATIENT
Start: 2025-08-04

## 2025-08-04 RX ORDER — ESCITALOPRAM OXALATE 10 MG/1
10 TABLET ORAL DAILY
Qty: 90 TABLET | Refills: 0 | Status: SHIPPED | OUTPATIENT
Start: 2025-08-04

## (undated) RX ORDER — FENTANYL CITRATE 50 UG/ML
INJECTION, SOLUTION INTRAMUSCULAR; INTRAVENOUS
Status: DISPENSED
Start: 2024-08-14

## (undated) RX ORDER — SIMETHICONE 40MG/0.6ML
SUSPENSION, DROPS(FINAL DOSAGE FORM)(ML) ORAL
Status: DISPENSED
Start: 2024-04-03

## (undated) RX ORDER — FENTANYL CITRATE 50 UG/ML
INJECTION, SOLUTION INTRAMUSCULAR; INTRAVENOUS
Status: DISPENSED
Start: 2024-04-03